# Patient Record
Sex: FEMALE | Race: WHITE | NOT HISPANIC OR LATINO | Employment: FULL TIME | URBAN - METROPOLITAN AREA
[De-identification: names, ages, dates, MRNs, and addresses within clinical notes are randomized per-mention and may not be internally consistent; named-entity substitution may affect disease eponyms.]

---

## 2020-09-17 ENCOUNTER — OFFICE VISIT (OUTPATIENT)
Dept: FAMILY MEDICINE | Facility: CLINIC | Age: 35
End: 2020-09-17
Payer: COMMERCIAL

## 2020-09-17 VITALS
HEART RATE: 74 BPM | DIASTOLIC BLOOD PRESSURE: 73 MMHG | OXYGEN SATURATION: 99 % | BODY MASS INDEX: 31.18 KG/M2 | HEIGHT: 66 IN | SYSTOLIC BLOOD PRESSURE: 107 MMHG | TEMPERATURE: 98 F | WEIGHT: 194 LBS

## 2020-09-17 DIAGNOSIS — N89.8 VAGINAL ODOR: ICD-10-CM

## 2020-09-17 DIAGNOSIS — J45.20 MILD INTERMITTENT ASTHMA WITHOUT COMPLICATION: ICD-10-CM

## 2020-09-17 DIAGNOSIS — R53.83 FATIGUE, UNSPECIFIED TYPE: ICD-10-CM

## 2020-09-17 DIAGNOSIS — G89.29 CHRONIC RIGHT SHOULDER PAIN: ICD-10-CM

## 2020-09-17 DIAGNOSIS — K21.9 GASTROESOPHAGEAL REFLUX DISEASE WITHOUT ESOPHAGITIS: Primary | ICD-10-CM

## 2020-09-17 DIAGNOSIS — M25.511 CHRONIC RIGHT SHOULDER PAIN: ICD-10-CM

## 2020-09-17 PROCEDURE — 99204 OFFICE O/P NEW MOD 45 MIN: CPT | Performed by: NURSE PRACTITIONER

## 2020-09-17 RX ORDER — ALBUTEROL SULFATE 0.83 MG/ML
2.5 SOLUTION RESPIRATORY (INHALATION) EVERY 6 HOURS PRN
COMMUNITY

## 2020-09-17 RX ORDER — OMEPRAZOLE 40 MG/1
40 CAPSULE, DELAYED RELEASE ORAL DAILY
Qty: 90 CAPSULE | Refills: 1 | Status: SHIPPED | OUTPATIENT
Start: 2020-09-17 | End: 2021-03-10

## 2020-09-17 RX ORDER — ALBUTEROL SULFATE 90 UG/1
AEROSOL, METERED RESPIRATORY (INHALATION)
COMMUNITY
Start: 2020-03-14

## 2020-09-17 RX ORDER — LANOLIN ALCOHOL/MO/W.PET/CERES
CREAM (GRAM) TOPICAL DAILY
COMMUNITY
End: 2022-03-21

## 2020-09-17 RX ORDER — FLUTICASONE PROPIONATE 50 MCG
1 SPRAY, SUSPENSION (ML) NASAL 2 TIMES DAILY
Qty: 47.4 ML | Refills: 1 | Status: SHIPPED | OUTPATIENT
Start: 2020-09-17 | End: 2021-03-10

## 2020-09-17 RX ORDER — L. ACIDOPHILUS/PECTIN, CITRUS 25MM-100MG
1 TABLET ORAL
COMMUNITY

## 2020-09-17 RX ORDER — FLUTICASONE PROPIONATE 50 MCG
1 SPRAY, SUSPENSION (ML) NASAL 2 TIMES DAILY
COMMUNITY
Start: 2020-06-15 | End: 2020-09-17

## 2020-09-17 RX ORDER — LORATADINE 10 MG/1
10 TABLET ORAL DAILY
COMMUNITY

## 2020-09-17 RX ORDER — OMEPRAZOLE 40 MG/1
40 CAPSULE, DELAYED RELEASE ORAL DAILY
COMMUNITY
Start: 2020-06-15 | End: 2020-09-17

## 2020-09-17 RX ORDER — LANOLIN ALCOHOL/MO/W.PET/CERES
400 CREAM (GRAM) TOPICAL DAILY
COMMUNITY

## 2020-09-17 RX ORDER — ETONOGESTREL AND ETHINYL ESTRADIOL VAGINAL RING .015; .12 MG/D; MG/D
RING VAGINAL
COMMUNITY
Start: 2020-09-05 | End: 2021-07-07

## 2020-09-17 RX ORDER — DIPHENHYDRAMINE HCL 25 MG
25 TABLET ORAL DAILY
COMMUNITY

## 2020-09-17 SDOH — HEALTH STABILITY: MENTAL HEALTH: HOW OFTEN DO YOU HAVE A DRINK CONTAINING ALCOHOL?: MONTHLY OR LESS

## 2020-09-17 ASSESSMENT — MIFFLIN-ST. JEOR: SCORE: 1601.73

## 2020-09-17 NOTE — PROGRESS NOTES
"Subjective     Stephen Nye is a 35 year old female who presents to clinic today for the following health issues:    HPI       New Patient/Transfer of Care    Patient moved to [place] from [place] in December 2019.  Needs to establish with provider.     She needs refills on a few medications and has a few concerns today.         Hx of GERD- well controlled on Prilosec.  Tried to come off and try alternative, but GERD was not controlled.  Had EGD in the past and was negative for any concerns.      Hx of asthma- reports this has been generally well controlled.   On Breo Ellipta for maintenance.   Has albuterol neb and inhaler for rescue.     Reports chronic pain in right shoulder.  Has been present for 10 years, got hurt playing soccer.  Denies any previous imaging or work-up.  Difficulty reaching arm behind back to fasten bra.  Feels weak when trying to lift weights.  Often click with movement.     Reports unexplained daytime fatigue.  Denies concern for sleep apnea. Generally sleeping okay.  Denies depression.       Reports vaginal odor. Denies other symptoms. Hx of BV in the past. Wears spandex shorts daily under clothes to avoid chaffing.  Has been told this might be contributing but she doesn't want to quit wearing them.  At night, she doesn't wear underwear.       Review of Systems   Constitutional, HEENT, cardiovascular, pulmonary, GI, , musculoskeletal, neuro, skin, endocrine and psych systems are negative, except as otherwise noted.      Objective    /73   Pulse 74   Temp 98  F (36.7  C)   Ht 1.676 m (5' 6\")   Wt 88 kg (194 lb)   SpO2 99%   BMI 31.31 kg/m    Body mass index is 31.31 kg/m .  Physical Exam   GENERAL: healthy, alert and no distress  EYES: Eyes grossly normal to inspection, PERRL and conjunctivae and sclerae normal  HENT: ear canals and TM's normal, nose and mouth without ulcers or lesions  NECK: no adenopathy, no asymmetry, masses, or scars and thyroid normal to palpation  RESP: lungs clear " "to auscultation - no rales, rhonchi or wheezes  CV: regular rate and rhythm, normal S1 S2, no S3 or S4, no murmur, click or rub, no peripheral edema and peripheral pulses strong  ABDOMEN: soft, nontender, no hepatosplenomegaly, no masses and bowel sounds normal  MS: no gross musculoskeletal defects noted, no edema  SKIN: no suspicious lesions or rashes  NEURO: Normal strength and tone, mentation intact and speech normal  PSYCH: mentation appears normal, affect normal/bright    Patient will return for labs and xray.         Assessment & Plan     Gastroesophageal reflux disease without esophagitis  - omeprazole (PRILOSEC) 40 MG DR capsule; Take 1 capsule (40 mg) by mouth daily    Mild intermittent asthma without complication  - BREO ELLIPTA 100-25 MCG/INH inhaler; Inhale 1 puff into the lungs daily  - fluticasone (FLONASE) 50 MCG/ACT nasal spray; Spray 1 spray in nostril 2 times daily    Chronic right shoulder pain  - Orthopedic & Spine  Referral; Future  - XR Shoulder Right 2 Views; Future    Fatigue, unspecified type  - CBC with platelets and differential; Future  - Comprehensive metabolic panel; Future  - TSH with free T4 reflex; Future    Vaginal odor  - Wet prep; Future      Chronic conditions well controlled.  Refills sent as requested.    She reports she is due for pap soon, will make appt for physical. We will try to get records from clinic in [place].   She had to leave for work, will return for labs and shoulder xray as ordered above.  Referral placed to ortho for chronic right shoulder pain.      BMI:   Estimated body mass index is 31.31 kg/m  as calculated from the following:    Height as of this encounter: 1.676 m (5' 6\").    Weight as of this encounter: 88 kg (194 lb).   Weight management plan: Discussed healthy diet and exercise guidelines        See Patient Instructions  Patient Instructions   Refills sent on medications.     Please return for labs (blood and vaginal swab)- do not need to be " fasting.  Will also check xray of your right shoulder.      Referral placed to orthopedics- you will be called to set up appointment.             Return in about 4 weeks (around 10/15/2020) for Annual physical exam with pap.    Magdalena Gongora, [facility]

## 2020-09-17 NOTE — LETTER
My Asthma Action Plan    Name: Stephen Nye   YOB: 1985  Date: 9/18/2020   My doctor: Magdalena Gongora CNP   My clinic: [facility]        My Rescue Medicine:   Albuterol inhaler (Proair/Ventolin/Proventil HFA)  2-4 puffs EVERY 4 HOURS as needed. Use a spacer if recommended by your provider.   My Asthma Severity:   Intermittent / Exercise Induced  Know your asthma triggers             GREEN ZONE   Good Control    I feel good    No cough or wheeze    Can work, sleep and play without asthma symptoms       Take your asthma control medicine every day.     1. If exercise triggers your asthma, take your rescue medication    15 minutes before exercise or sports, and    During exercise if you have asthma symptoms  2. Spacer to use with inhaler: If you have a spacer, make sure to use it with your inhaler             YELLOW ZONE Getting Worse  I have ANY of these:    I do not feel good    Cough or wheeze    Chest feels tight    Wake up at night   1. Keep taking your Green Zone medications  2. Start taking your rescue medicine:    every 20 minutes for up to 1 hour. Then every 4 hours for 24-48 hours.  3. If you stay in the Yellow Zone for more than 12-24 hours, contact your doctor.  4. If you do not return to the Green Zone in 12-24 hours or you get worse, start taking your oral steroid medicine if prescribed by your provider.           RED ZONE Medical Alert - Get Help  I have ANY of these:    I feel awful    Medicine is not helping    Breathing getting harder    Trouble walking or talking    Nose opens wide to breathe       1. Take your rescue medicine NOW  2. If your provider has prescribed an oral steroid medicine, start taking it NOW  3. Call your doctor NOW  4. If you are still in the Red Zone after 20 minutes and you have not reached your doctor:    Take your rescue medicine again and    Call 911 or go to the emergency room right away    See your regular doctor within 2 weeks of an  Emergency Room or Urgent Care visit for follow-up treatment.          Annual Reminders:  Meet with Asthma Educator,  Flu Shot in the Fall, consider Pneumonia Vaccination for patients with asthma (aged 19 and older).    Pharmacy: [address]    Electronically signed by Magdalena Gongora CNP   Date: 09/18/20                    Asthma Triggers  How To Control Things That Make Your Asthma Worse    Triggers are things that make your asthma worse.  Look at the list below to help you find your triggers and   what you can do about them. You can help prevent asthma flare-ups by staying away from your triggers.      Trigger                                                          What you can do   Cigarette Smoke  Tobacco smoke can make asthma worse. Do not allow smoking in your home, car or around you.  Be sure no one smokes at a child s day care or school.  If you smoke, ask your health care provider for ways to help you quit.  Ask family members to quit too.  Ask your health care provider for a referral to Quit Plan to help you quit smoking, or call 8-277-974-PLAN.     Colds, Flu, Bronchitis  These are common triggers of asthma. Wash your hands often.  Don t touch your eyes, nose or mouth.  Get a flu shot every year.     Dust Mites  These are tiny bugs that live in cloth or carpet. They are too small to see. Wash sheets and blankets in hot water every week.   Encase pillows and mattress in dust mite proof covers.  Avoid having carpet if you can. If you have carpet, vacuum weekly.   Use a dust mask and HEPA vacuum.   Pollen and Outdoor Mold  Some people are allergic to trees, grass, or weed pollen, or molds. Try to keep your windows closed.  Limit time out doors when pollen count is high.   Ask you health care provider about taking medicine during allergy season.     Animal Dander  Some people are allergic to skin flakes, urine or saliva from pets with fur or  feathers. Keep pets with fur or feathers out of your home.    If you can t keep the pet outdoors, then keep the pet out of your bedroom.  Keep the bedroom door closed.  Keep pets off cloth furniture and away from stuffed toys.     Mice, Rats, and Cockroaches  Some people are allergic to the waste from these pests.   Cover food and garbage.  Clean up spills and food crumbs.  Store grease in the refrigerator.   Keep food out of the bedroom.   Indoor Mold  This can be a trigger if your home has high moisture. Fix leaking faucets, pipes, or other sources of water.   Clean moldy surfaces.  Dehumidify basement if it is damp and smelly.   Smoke, Strong Odors, and Sprays  These can reduce air quality. Stay away from strong odors and sprays, such as perfume, powder, hair spray, paints, smoke incense, paint, cleaning products, candles and new carpet.   Exercise or Sports  Some people with asthma have this trigger. Be active!  Ask your doctor about taking medicine before sports or exercise to prevent symptoms.    Warm up for 5-10 minutes before and after sports or exercise.     Other Triggers of Asthma  Cold air:  Cover your nose and mouth with a scarf.  Sometimes laughing or crying can be a trigger.  Some medicines and food can trigger asthma.

## 2020-09-17 NOTE — PATIENT INSTRUCTIONS
Refills sent on medications.     Please return for labs (blood and vaginal swab)- do not need to be fasting.  Will also check xray of your right shoulder.      Referral placed to orthopedics- you will be called to set up appointment.

## 2020-09-18 ASSESSMENT — ASTHMA QUESTIONNAIRES: ACT_TOTALSCORE: 23

## 2020-10-02 ENCOUNTER — ANCILLARY PROCEDURE (OUTPATIENT)
Dept: GENERAL RADIOLOGY | Facility: CLINIC | Age: 35
End: 2020-10-02
Attending: NURSE PRACTITIONER
Payer: COMMERCIAL

## 2020-10-02 DIAGNOSIS — N89.8 VAGINAL ODOR: ICD-10-CM

## 2020-10-02 DIAGNOSIS — R53.83 FATIGUE, UNSPECIFIED TYPE: ICD-10-CM

## 2020-10-02 LAB
ALBUMIN SERPL-MCNC: 3.4 G/DL (ref 3.4–5)
ALP SERPL-CCNC: 77 U/L (ref 40–150)
ALT SERPL W P-5'-P-CCNC: 43 U/L (ref 0–50)
ANION GAP SERPL CALCULATED.3IONS-SCNC: 7 MMOL/L (ref 3–14)
AST SERPL W P-5'-P-CCNC: 26 U/L (ref 0–45)
BASOPHILS # BLD AUTO: 0 10E9/L (ref 0–0.2)
BASOPHILS NFR BLD AUTO: 0.7 %
BILIRUB SERPL-MCNC: 0.3 MG/DL (ref 0.2–1.3)
BUN SERPL-MCNC: 14 MG/DL (ref 7–30)
CALCIUM SERPL-MCNC: 8.3 MG/DL (ref 8.5–10.1)
CHLORIDE SERPL-SCNC: 105 MMOL/L (ref 94–109)
CO2 SERPL-SCNC: 26 MMOL/L (ref 20–32)
CREAT SERPL-MCNC: 1 MG/DL (ref 0.52–1.04)
DIFFERENTIAL METHOD BLD: NORMAL
EOSINOPHIL # BLD AUTO: 0.1 10E9/L (ref 0–0.7)
EOSINOPHIL NFR BLD AUTO: 2.2 %
ERYTHROCYTE [DISTWIDTH] IN BLOOD BY AUTOMATED COUNT: 11.9 % (ref 10–15)
GFR SERPL CREATININE-BSD FRML MDRD: 74 ML/MIN/{1.73_M2}
GLUCOSE SERPL-MCNC: 91 MG/DL (ref 70–99)
HCT VFR BLD AUTO: 39.3 % (ref 35–47)
HGB BLD-MCNC: 13.7 G/DL (ref 11.7–15.7)
LYMPHOCYTES # BLD AUTO: 1.4 10E9/L (ref 0.8–5.3)
LYMPHOCYTES NFR BLD AUTO: 34.1 %
MCH RBC QN AUTO: 31.1 PG (ref 26.5–33)
MCHC RBC AUTO-ENTMCNC: 34.9 G/DL (ref 31.5–36.5)
MCV RBC AUTO: 89 FL (ref 78–100)
MONOCYTES # BLD AUTO: 0.4 10E9/L (ref 0–1.3)
MONOCYTES NFR BLD AUTO: 9.2 %
NEUTROPHILS # BLD AUTO: 2.2 10E9/L (ref 1.6–8.3)
NEUTROPHILS NFR BLD AUTO: 53.8 %
PLATELET # BLD AUTO: 237 10E9/L (ref 150–450)
POTASSIUM SERPL-SCNC: 3.7 MMOL/L (ref 3.4–5.3)
PROT SERPL-MCNC: 7.8 G/DL (ref 6.8–8.8)
RBC # BLD AUTO: 4.4 10E12/L (ref 3.8–5.2)
SODIUM SERPL-SCNC: 138 MMOL/L (ref 133–144)
SPECIMEN SOURCE: NORMAL
TSH SERPL DL<=0.005 MIU/L-ACNC: 3.78 MU/L (ref 0.4–4)
WBC # BLD AUTO: 4.1 10E9/L (ref 4–11)
WET PREP SPEC: NORMAL

## 2020-10-02 PROCEDURE — 87210 SMEAR WET MOUNT SALINE/INK: CPT | Performed by: NURSE PRACTITIONER

## 2020-10-02 PROCEDURE — 36415 COLL VENOUS BLD VENIPUNCTURE: CPT | Performed by: NURSE PRACTITIONER

## 2020-10-02 PROCEDURE — 73030 X-RAY EXAM OF SHOULDER: CPT | Mod: RT | Performed by: RADIOLOGY

## 2020-10-02 PROCEDURE — 80050 GENERAL HEALTH PANEL: CPT | Performed by: NURSE PRACTITIONER
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
## 2020-12-10 RX ORDER — FLUCONAZOLE 150 MG/1
150 TABLET ORAL DAILY
Qty: 3 TABLET | Refills: 0 | Status: SHIPPED | OUTPATIENT
Start: 2020-12-10 | End: 2020-12-13

## 2020-12-10 RX ORDER — OXYCODONE HYDROCHLORIDE 5 MG/1
5 TABLET ORAL EVERY 6 HOURS PRN
Qty: 12 TABLET | Refills: 0 | Status: SHIPPED | OUTPATIENT
Start: 2020-12-10 | End: 2020-12-13
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
## 2021-01-04 ENCOUNTER — HEALTH MAINTENANCE LETTER (OUTPATIENT)
Age: 36
End: 2021-01-04

## 2021-03-10 ENCOUNTER — OFFICE VISIT (OUTPATIENT)
Dept: FAMILY MEDICINE | Facility: CLINIC | Age: 36
End: 2021-03-10
Payer: COMMERCIAL

## 2021-03-10 ENCOUNTER — TELEPHONE (OUTPATIENT)
Dept: INTERNAL MEDICINE | Facility: CLINIC | Age: 36
End: 2021-03-10

## 2021-03-10 VITALS
HEIGHT: 66 IN | DIASTOLIC BLOOD PRESSURE: 71 MMHG | WEIGHT: 194 LBS | OXYGEN SATURATION: 99 % | HEART RATE: 66 BPM | SYSTOLIC BLOOD PRESSURE: 105 MMHG | TEMPERATURE: 97.6 F | BODY MASS INDEX: 31.18 KG/M2

## 2021-03-10 DIAGNOSIS — Z12.4 SCREENING FOR MALIGNANT NEOPLASM OF CERVIX: ICD-10-CM

## 2021-03-10 DIAGNOSIS — Z80.3 FAMILY HISTORY OF MALIGNANT NEOPLASM OF BREAST: ICD-10-CM

## 2021-03-10 DIAGNOSIS — N89.8 VAGINAL DISCHARGE: ICD-10-CM

## 2021-03-10 DIAGNOSIS — J45.20 MILD INTERMITTENT ASTHMA WITHOUT COMPLICATION: ICD-10-CM

## 2021-03-10 DIAGNOSIS — Z00.00 ROUTINE GENERAL MEDICAL EXAMINATION AT A HEALTH CARE FACILITY: Primary | ICD-10-CM

## 2021-03-10 DIAGNOSIS — Z13.220 LIPID SCREENING: ICD-10-CM

## 2021-03-10 DIAGNOSIS — K21.9 GASTROESOPHAGEAL REFLUX DISEASE WITHOUT ESOPHAGITIS: ICD-10-CM

## 2021-03-10 LAB
CHOLEST SERPL-MCNC: 243 MG/DL
HDLC SERPL-MCNC: 54 MG/DL
LDLC SERPL CALC-MCNC: 158 MG/DL
NONHDLC SERPL-MCNC: 189 MG/DL
SPECIMEN SOURCE: NORMAL
TRIGL SERPL-MCNC: 156 MG/DL
WET PREP SPEC: NORMAL

## 2021-03-10 PROCEDURE — 36415 COLL VENOUS BLD VENIPUNCTURE: CPT | Performed by: NURSE PRACTITIONER

## 2021-03-10 PROCEDURE — 80061 LIPID PANEL: CPT | Performed by: NURSE PRACTITIONER

## 2021-03-10 PROCEDURE — 87624 HPV HI-RISK TYP POOLED RSLT: CPT | Performed by: NURSE PRACTITIONER

## 2021-03-10 PROCEDURE — 99395 PREV VISIT EST AGE 18-39: CPT | Performed by: NURSE PRACTITIONER

## 2021-03-10 PROCEDURE — G0145 SCR C/V CYTO,THINLAYER,RESCR: HCPCS | Performed by: NURSE PRACTITIONER

## 2021-03-10 PROCEDURE — 87210 SMEAR WET MOUNT SALINE/INK: CPT | Performed by: NURSE PRACTITIONER

## 2021-03-10 RX ORDER — FLUTICASONE PROPIONATE 50 MCG
1 SPRAY, SUSPENSION (ML) NASAL 2 TIMES DAILY
Qty: 47.7 ML | Refills: 3 | Status: SHIPPED | OUTPATIENT
Start: 2021-03-10 | End: 2022-08-26

## 2021-03-10 RX ORDER — OMEPRAZOLE 40 MG/1
40 CAPSULE, DELAYED RELEASE ORAL DAILY
Qty: 90 CAPSULE | Refills: 3 | Status: SHIPPED | OUTPATIENT
Start: 2021-03-10 | End: 2022-03-21

## 2021-03-10 ASSESSMENT — ENCOUNTER SYMPTOMS
ABDOMINAL PAIN: 0
NERVOUS/ANXIOUS: 0
MYALGIAS: 0
CHILLS: 0
PALPITATIONS: 0
CONSTIPATION: 0
HEARTBURN: 0
DYSURIA: 0
PARESTHESIAS: 0
HEMATOCHEZIA: 0
FEVER: 0
EYE PAIN: 0
ARTHRALGIAS: 1
FREQUENCY: 0
DIZZINESS: 0
HEADACHES: 0
HEMATURIA: 0
JOINT SWELLING: 0
BREAST MASS: 0
SORE THROAT: 0
SHORTNESS OF BREATH: 0
COUGH: 0
DIARRHEA: 0
WEAKNESS: 0
NAUSEA: 0

## 2021-03-10 ASSESSMENT — MIFFLIN-ST. JEOR: SCORE: 1601.73

## 2021-03-10 NOTE — PROGRESS NOTES
SUBJECTIVE:   CC: Stephen Nye is an 35 year old woman who presents for preventive health visit.       Due for pap today- records not available.  Patient states history of abnormal pap and HPV positive in 2006.  Had cryo procedure following that and has had normal pap/hpv since.  She believes last pap was 2019.  She has been having more vaginal discharge since using Nuvaring.  Denies any fever, chills, abdominal pain or odors.     History of breast cancer in maternal grandmother when she was in late 20s or early 30s.   Denies family history in first degree relative.     Mother  from COVID-19 complications in December.  Feels like she is coping okay. Started exercising again, this is helping. Declines therapy.        Hx of GERD- well controlled on Prilosec.  Tried to come off and try alternative, but GERD was not controlled.  Had EGD in the past and was negative for any concerns.       Hx of asthma- reports this has been generally well controlled.   On Breo Ellipta for maintenance.   Has albuterol neb and inhaler for rescue.  On Flonase for allergies.       Patient has been advised of split billing requirements and indicates understanding: Yes  Healthy Habits:     Getting at least 3 servings of Calcium per day:  NO    Bi-annual eye exam:  Yes    Dental care twice a year:  NO    Sleep apnea or symptoms of sleep apnea:  Daytime drowsiness    Diet:  Regular (no restrictions)    Frequency of exercise:  None    Taking medications regularly:  Yes    Medication side effects:  None    PHQ-2 Total Score: 2    Additional concerns today:  No        Today's PHQ-2 Score:   PHQ-2 (  [employer]) 3/10/2021   Q1: Little interest or pleasure in doing things 1   Q2: Feeling down, depressed or hopeless 1   PHQ-2 Score 2   Q1: Little interest or pleasure in doing things Several days   Q2: Feeling down, depressed or hopeless Several days   PHQ-2 Score 2       Abuse: Current or Past (Physical, Sexual or Emotional) - No  Do you feel  safe in your environment? Yes    Have you ever done Advance Care Planning? (For example, a Health Directive, POLST, or a discussion with a medical provider or your loved ones about your wishes): No, advance care planning information given to patient to review.  Patient declined advance care planning discussion at this time.    Social History     Tobacco Use     Smoking status: Never Smoker     Smokeless tobacco: Never Used   Substance Use Topics     Alcohol use: Yes     Frequency: Monthly or less     If you drink alcohol do you typically have >3 drinks per day or >7 drinks per week? No    Alcohol Use 3/10/2021   Prescreen: >3 drinks/day or >7 drinks/week? Not Applicable   Prescreen: >3 drinks/day or >7 drinks/week? -         Reviewed orders with patient.  Reviewed health maintenance and updated orders accordingly - Yes      Breast CA Risk Screening:  Breast CA Risk Assessment (FHS-7) 3/10/2021   Do you have a family history of breast, colon, or ovarian cancer? Yes- maternal grandmother    Did any of your first-degree relatives have breast or ovarian cancer? No   Did any of your relatives have bilateral breast cancer? Unknown   Did any man in your family have breast cancer? No   Did any woman in your family have breast and ovarian cancer? No   Did any woman in your family have breast cancer before age 50 y? Yes- grandmother age 20s-30   Do you have 2 or more relatives with breast and/or ovarian cancer? No   Do you have 2 or more relatives with breast and/or bowel cancer? No     Patient under 40 years of age: Routine Mammogram Screening not recommended.   Pertinent mammograms are reviewed under the imaging tab.    History of abnormal Pap smear: YES per patient, although records not available.      Reviewed and updated as needed this visit by clinical staff  Tobacco  Allergies  Meds  Problems  Med Hx  Surg Hx  Fam Hx          Reviewed and updated as needed this visit by Provider  Tobacco  Allergies  Meds   "Problems  Med Hx  Surg Hx  Fam Hx             Review of Systems   Constitutional: Negative for chills and fever.   HENT: Negative for congestion, ear pain, hearing loss and sore throat.    Eyes: Negative for pain and visual disturbance.   Respiratory: Negative for cough and shortness of breath.    Cardiovascular: Negative for chest pain, palpitations and peripheral edema.   Gastrointestinal: Negative for abdominal pain, constipation, diarrhea, heartburn, hematochezia and nausea.   Breasts:  Negative for tenderness, breast mass and discharge.   Genitourinary: Positive for vaginal discharge. Negative for dysuria, frequency, genital sores, hematuria, pelvic pain, urgency and vaginal bleeding.   Musculoskeletal: Positive for arthralgias. Negative for joint swelling and myalgias.   Skin: Negative for rash.   Neurological: Negative for dizziness, weakness, headaches and paresthesias.   Psychiatric/Behavioral: Positive for mood changes. The patient is not nervous/anxious.         OBJECTIVE:   /71   Pulse 66   Temp 97.6  F (36.4  C)   Ht 1.676 m (5' 6\")   Wt 88 kg (194 lb)   SpO2 99%   BMI 31.31 kg/m    Physical Exam  GENERAL: healthy, alert and no distress  EYES: Eyes grossly normal to inspection, PERRL and conjunctivae and sclerae normal  HENT: ear canals and TM's normal, nose and mouth without ulcers or lesions  NECK: no adenopathy, no asymmetry, masses, or scars and thyroid normal to palpation  RESP: lungs clear to auscultation - no rales, rhonchi or wheezes  BREAST: normal without masses, tenderness or nipple discharge and no palpable axillary masses or adenopathy  CV: regular rate and rhythm, normal S1 S2, no S3 or S4, no murmur, click or rub, no peripheral edema and peripheral pulses strong  ABDOMEN: soft, nontender, no hepatosplenomegaly, no masses and bowel sounds normal   (female): normal female external genitalia, normal urethral meatus, vaginal mucosa pink, moist, well rugated, and normal " "cervix/adnexa/uterus without masses or discharge  MS: no gross musculoskeletal defects noted, no edema  SKIN: no suspicious lesions or rashes  NEURO: Normal strength and tone, mentation intact and speech normal  PSYCH: mentation appears normal, affect normal/bright    Diagnostic Test Results:  Labs reviewed in Epic    ASSESSMENT/PLAN:   1. Routine general medical examination at a health care facility  Continue annual exams    2. Screening for malignant neoplasm of cervix  - Pap imaged thin layer screen with HPV - recommended age 30 - 65 years (select HPV order below)  - HPV High Risk Types DNA Cervical    3. Family history of malignant neoplasm of breast  Breast cancer in maternal grandmother at young age   - CANCER RISK MGMT/CANCER GENETIC COUNSELING REFERRAL    4. Lipid screening  - Lipid panel reflex to direct LDL Fasting    5. Mild intermittent asthma without complication  Chronic, stable.   - BREO ELLIPTA 100-25 MCG/INH inhaler; Inhale 1 puff into the lungs daily  Dispense: 3 each; Refill: 3  - fluticasone (FLONASE) 50 MCG/ACT nasal spray; Spray 1 spray in nostril 2 times daily  Dispense: 47.7 mL; Refill: 3    6. Gastroesophageal reflux disease without esophagitis  Chronic, stable.   - omeprazole (PRILOSEC) 40 MG DR capsule; Take 1 capsule (40 mg) by mouth daily  Dispense: 90 capsule; Refill: 3    7. Vaginal discharge  Wet prep normal.   - Wet prep    Patient has been advised of split billing requirements and indicates understanding: Yes  COUNSELING:  Reviewed preventive health counseling, as reflected in patient instructions    Estimated body mass index is 31.31 kg/m  as calculated from the following:    Height as of this encounter: 1.676 m (5' 6\").    Weight as of this encounter: 88 kg (194 lb).      She reports that she has never smoked. She has never used smokeless tobacco.      Counseling Resources:  ATP IV Guidelines  Pooled Cohorts Equation Calculator  Breast Cancer Risk Calculator  BRCA-Related Cancer " Risk Assessment: FHS-7 Tool  FRAX Risk Assessment  ICSI Preventive Guidelines  Dietary Guidelines for Americans, 2010  USDA's MyPlate  ASA Prophylaxis  Lung CA Screening    Magdalena Gongora [facility]

## 2021-03-10 NOTE — Clinical Note
Please mail patient ACT to complete and return, was missed today.   Thank you! Magdalena Gongora, CNP

## 2021-03-12 ENCOUNTER — TELEPHONE (OUTPATIENT)
Dept: INTERNAL MEDICINE | Facility: CLINIC | Age: 36
End: 2021-03-12

## 2021-03-12 LAB
COPATH REPORT: NORMAL
PAP: NORMAL

## 2021-03-12 NOTE — LETTER
"    March 12, 2021       TO: Stephen Nye  [id number] [address] [address]         Dear Ms. Nye,    Our records indicate that you have not scheduled an appointment for a consult, as recommended by Magdalena Gongora CNP. If you wish to schedule within [facility], we have several options to help you schedule your appointment:      Call [phone]    Visit our website at [email or web address] and click \"I Want To\" (in upper right) and select Request an Appointment    Request an appointment via [employer] at [email or web address]     If you have chosen to schedule elsewhere or if you have already made an appointment, please disregard this letter.    If you have any questions or concerns regarding the information above, please contact the [facility] at [phone].      Sincerely,      [facility]                  "

## 2021-03-12 NOTE — TELEPHONE ENCOUNTER
Oncology/Surgical Oncology Referral Request:     Specialty Requested: Medical Oncology     Referring Provider: Magdalena Gongora CNP    Referring Clinic/Organization: [facility]    Records location: [facility]     Requested Provider (if specified): Not Specified       Called pt x 2, LVM. Sending letter.

## 2021-03-16 LAB
FINAL DIAGNOSIS: NORMAL
HPV HR 12 DNA CVX QL NAA+PROBE: NEGATIVE
HPV16 DNA SPEC QL NAA+PROBE: NEGATIVE
HPV18 DNA SPEC QL NAA+PROBE: NEGATIVE
SPECIMEN DESCRIPTION: NORMAL
SPECIMEN SOURCE CVX/VAG CYTO: NORMAL

## 2021-03-19 ENCOUNTER — PATIENT OUTREACH (OUTPATIENT)
Dept: INTERNAL MEDICINE | Facility: CLINIC | Age: 36
End: 2021-03-19

## 2021-03-19 NOTE — TELEPHONE ENCOUNTER
"\"Patient states history of abnormal pap and HPV positive in 2006.  Had cryo procedure following that and has had normal pap/hpv since.  She believes last pap was 2019.\" - 3/10/2021 office visit notes    3/10/2021 NIL pap, neg HPV. Plan cotest in 1 year based on +abnormal history per patient report. No previous pap records available to review.  "

## 2021-07-07 DIAGNOSIS — Z30.015 ENCOUNTER FOR INITIAL PRESCRIPTION OF VAGINAL RING HORMONAL CONTRACEPTIVE: Primary | ICD-10-CM

## 2021-07-08 RX ORDER — ETONOGESTREL AND ETHINYL ESTRADIOL VAGINAL RING .015; .12 MG/D; MG/D
1 RING VAGINAL
Qty: 3 EACH | Refills: 2 | Status: SHIPPED | OUTPATIENT
Start: 2021-07-08 | End: 2022-03-02

## 2021-07-08 NOTE — TELEPHONE ENCOUNTER
Routing refill request to provider for review/approval because:  Medication is reported/historical    Darlin Mercado BSN, RN

## 2021-10-11 ENCOUNTER — HEALTH MAINTENANCE LETTER (OUTPATIENT)
Age: 36
End: 2021-10-11

## 2021-12-15 ENCOUNTER — OFFICE VISIT (OUTPATIENT)
Dept: OPTOMETRY | Facility: CLINIC | Age: 36
End: 2021-12-15
Payer: COMMERCIAL

## 2021-12-15 ENCOUNTER — TELEPHONE (OUTPATIENT)
Dept: OPTOMETRY | Facility: CLINIC | Age: 36
End: 2021-12-15

## 2021-12-15 DIAGNOSIS — H04.123 DRY EYES: ICD-10-CM

## 2021-12-15 DIAGNOSIS — H18.831 RECURRENT EROSION OF RIGHT CORNEA: ICD-10-CM

## 2021-12-15 DIAGNOSIS — H52.13 MYOPIA OF BOTH EYES: Primary | ICD-10-CM

## 2021-12-15 PROCEDURE — 92004 COMPRE OPH EXAM NEW PT 1/>: CPT | Performed by: OPTOMETRIST

## 2021-12-15 PROCEDURE — 92015 DETERMINE REFRACTIVE STATE: CPT | Performed by: OPTOMETRIST

## 2021-12-15 RX ORDER — SODIUM CHLORIDE 5 %
OINTMENT (GRAM) OPHTHALMIC (EYE)
Qty: 3.5 G | Refills: 3 | COMMUNITY

## 2021-12-15 RX ORDER — SILICONE ADHESIVE 1.5" X 3"
1 SHEET (EA) TOPICAL
Qty: 15 ML | Refills: 3 | Status: SHIPPED | OUTPATIENT
Start: 2021-12-15 | End: 2021-12-15

## 2021-12-15 RX ORDER — SODIUM CHLORIDE 5 %
OINTMENT (GRAM) OPHTHALMIC (EYE)
Qty: 3.5 G | Refills: 3 | Status: SHIPPED | OUTPATIENT
Start: 2021-12-15 | End: 2021-12-15

## 2021-12-15 RX ORDER — SILICONE ADHESIVE 1.5" X 3"
1 SHEET (EA) TOPICAL 3 TIMES DAILY
Qty: 15 ML | Refills: 3 | Status: SHIPPED | OUTPATIENT
Start: 2021-12-15 | End: 2022-02-13

## 2021-12-15 ASSESSMENT — REFRACTION_MANIFEST
OD_CYLINDER: +0.25
OS_CYLINDER: SPHERE
OD_AXIS: 010
OS_SPHERE: -0.75
OD_AXIS: 025
OD_SPHERE: -0.50
METHOD_AUTOREFRACTION: 1
OD_SPHERE: -1.25
OS_AXIS: 115
OS_CYLINDER: +0.25
OD_CYLINDER: +0.50
OS_SPHERE: -0.50

## 2021-12-15 ASSESSMENT — VISUAL ACUITY
OS_SC: 20/25
OD_SC+: -1
OD_SC: 20/20
METHOD: SNELLEN - LINEAR
OD_SC: 20/25
OS_SC: 20/20
OS_SC+: -1

## 2021-12-15 ASSESSMENT — KERATOMETRY
OS_K1POWER_DIOPTERS: 40.25
OS_AXISANGLE2_DEGREES: 19
OD_AXISANGLE2_DEGREES: 151
OD_K2POWER_DIOPTERS: 41.50
OS_K2POWER_DIOPTERS: 41.25
OD_K1POWER_DIOPTERS: 41.00

## 2021-12-15 ASSESSMENT — TONOMETRY
IOP_METHOD: APPLANATION
OS_IOP_MMHG: 16
OD_IOP_MMHG: 16

## 2021-12-15 ASSESSMENT — CONF VISUAL FIELD
METHOD: COUNTING FINGERS
OS_NORMAL: 1
OD_NORMAL: 1

## 2021-12-15 ASSESSMENT — CUP TO DISC RATIO
OS_RATIO: 0.2
OD_RATIO: 0.2

## 2021-12-15 NOTE — PROGRESS NOTES
Chief Complaint   Patient presents with     COMPREHENSIVE EYE EXAM     New to Eye Dept          Last Eye Exam: 12/2018  Dilated Previously: Yes, does not want to be dilated today. She has to work in the OR after this appointment     What are you currently using to see?  does not use glasses or contacts       Distance Vision Acuity: Satisfied with vision, sometimes right eye gets blurry when she's tired     Near Vision Acuity: Satisfied with vision while reading and using computer unaided, Does have trouble at times transitioning from near to far - after using  LASIK Nov 2011    Eye Comfort: good, dry and also has been experiencing some sensitivity or FB feelings in her right eye. That eye also gets tired faster and red quicker.  Worse since wore colored contact lenses for Halloween    Do you use eye drops? : Yes: As needed, Refresh Optive NPT   Occupation or Hobbies: RN, [employer]       Dry in morning ,     Medical, surgical and family histories reviewed and updated 12/15/2021.       OBJECTIVE: See Ophthalmology exam    ASSESSMENT:    ICD-10-CM    1. Myopia of both eyes  H52.13 EYE EXAM (SIMPLE-NONBILLABLE)     REFRACTION   2. Recurrent erosion of right cornea  H18.831 EYE EXAM (SIMPLE-NONBILLABLE)     REFRACTION     sodium chloride (FLACO 128) 5 % ophthalmic solution     DISCONTINUED: sodium chloride (FLACO 128) 5 % ophthalmic ointment     DISCONTINUED: sodium chloride (FLACO 128) 5 % ophthalmic solution   3. Dry eyes  H04.123 EYE EXAM (SIMPLE-NONBILLABLE)     REFRACTION      PLAN:     Patient Instructions   Optional to fill new glasses prescription, minimal  glasses prescription   Use Thera Tears Nighttime Dry eye Therapy Lubricant Eye Gel- Preservative free,   3-4  times a day as needed   Use Flaco ointment at bed time, Flaco solution 3 times a day   Return in 1 year for eye exam    Marni Ayala, OD  [phone]

## 2021-12-15 NOTE — PATIENT INSTRUCTIONS
Optional to fill new glasses prescription, minimal  glasses prescription   Use Thera Tears Nighttime Dry eye Therapy Lubricant Eye Gel- Preservative free,   3-4  times a day as needed   Use Flaco ointment at bed time, Flaco solution 3 times a day   Return in 1 year for eye exam    Marni Ayala, OD  [phone]

## 2021-12-15 NOTE — LETTER
12/15/2021         RE: Stephen Nye  [address] [facility] [address]        Dear Colleague,    Thank you for referring your patient, Stephen Nye, to the [facility]. Please see a copy of my visit note below.    Chief Complaint   Patient presents with     COMPREHENSIVE EYE EXAM     New to Eye Dept          Last Eye Exam: 12/2018  Dilated Previously: Yes, does not want to be dilated today. She has to work in the OR after this appointment     What are you currently using to see?  does not use glasses or contacts       Distance Vision Acuity: Satisfied with vision, sometimes right eye gets blurry when she's tired     Near Vision Acuity: Satisfied with vision while reading and using computer unaided, Does have trouble at times transitioning from near to far - after using  LASIK Nov 2011    Eye Comfort: good, dry and also has been experiencing some sensitivity or FB feelings in her right eye. That eye also gets tired faster and red quicker.  Worse since wore colored contact lenses for Halloween    Do you use eye drops? : Yes: As needed, Refresh Optive NPT   Occupation or Hobbies: RN, For [employer]       Dry in morning ,     Medical, surgical and family histories reviewed and updated 12/15/2021.       OBJECTIVE: See Ophthalmology exam    ASSESSMENT:    ICD-10-CM    1. Myopia of both eyes  H52.13 EYE EXAM (SIMPLE-NONBILLABLE)     REFRACTION   2. Recurrent erosion of right cornea  H18.831 EYE EXAM (SIMPLE-NONBILLABLE)     REFRACTION     sodium chloride (PEREZ 128) 5 % ophthalmic solution     DISCONTINUED: sodium chloride (PEREZ 128) 5 % ophthalmic ointment     DISCONTINUED: sodium chloride (PEREZ 128) 5 % ophthalmic solution   3. Dry eyes  H04.123 EYE EXAM (SIMPLE-NONBILLABLE)     REFRACTION      PLAN:     Patient Instructions   Optional to fill new glasses prescription, minimal  glasses prescription   Use Thera Tears Nighttime Dry eye  Therapy Lubricant Eye Gel- Preservative free,   3-4  times a day as needed   Use Flaco ointment at bed time, Flaco solution 3 times a day   Return in 1 year for eye exam    Marni Ayala, OD  [phone]                       Again, thank you for allowing me to participate in the care of your patient.        Sincerely,        Marni Ayala, OD

## 2021-12-15 NOTE — TELEPHONE ENCOUNTER
The pharmacy is wondering about the instructions with the prescription. It says on the prescription to use this every three hours for dry eye and this eye drop isn't normally used for this type of medication so they wanted to get clarification on this. They would like a call back to discuss at: [phone] when able.    Thank You,    Bill Casey  Patient Rep

## 2022-01-19 ENCOUNTER — MYC MEDICAL ADVICE (OUTPATIENT)
Dept: FAMILY MEDICINE | Facility: CLINIC | Age: 37
End: 2022-01-19
Payer: COMMERCIAL

## 2022-01-24 ASSESSMENT — ASTHMA QUESTIONNAIRES: ACT_TOTALSCORE: 24

## 2022-02-18 ENCOUNTER — PATIENT OUTREACH (OUTPATIENT)
Dept: FAMILY MEDICINE | Facility: CLINIC | Age: 37
End: 2022-02-18
Payer: COMMERCIAL

## 2022-02-18 PROBLEM — Z87.42 HX OF ABNORMAL CERVICAL PAP SMEAR: Status: ACTIVE | Noted: 2021-03-19

## 2022-03-01 DIAGNOSIS — Z30.015 ENCOUNTER FOR INITIAL PRESCRIPTION OF VAGINAL RING HORMONAL CONTRACEPTIVE: ICD-10-CM

## 2022-03-02 ENCOUNTER — TELEPHONE (OUTPATIENT)
Dept: INTERNAL MEDICINE | Facility: CLINIC | Age: 37
End: 2022-03-02
Payer: COMMERCIAL

## 2022-03-02 DIAGNOSIS — Z30.015 ENCOUNTER FOR INITIAL PRESCRIPTION OF VAGINAL RING HORMONAL CONTRACEPTIVE: ICD-10-CM

## 2022-03-02 RX ORDER — ETONOGESTREL AND ETHINYL ESTRADIOL VAGINAL RING .015; .12 MG/D; MG/D
RING VAGINAL
Qty: 3 EACH | Refills: 2 | Status: SHIPPED | OUTPATIENT
Start: 2022-03-02 | End: 2022-03-21

## 2022-03-02 RX ORDER — ETONOGESTREL AND ETHINYL ESTRADIOL VAGINAL RING .015; .12 MG/D; MG/D
1 RING VAGINAL
Qty: 3 EACH | Refills: 0 | Status: SHIPPED | OUTPATIENT
Start: 2022-03-02 | End: 2022-03-02

## 2022-03-02 NOTE — TELEPHONE ENCOUNTER
Leave in for 3 weeks, remove for 1 week, then place new ring.   I added additional instructions. Magdalena Gongora, CNP

## 2022-03-02 NOTE — TELEPHONE ENCOUNTER
Pharmacy called because they received prescription for Nuvaring.    They need clarification on the sig.    Sig - Route: Place 1 each vaginally every 30 days - Vaginal    Last time the medication was refilled they had called the pt to get clarification and pt told them that she wears the ring for 3 weeks and then removes it for 1 week.    Pharmacy is wanting to know if instructions changed.      Routing to provider.    Danna Mcgowan RN  [facility]

## 2022-03-03 NOTE — TELEPHONE ENCOUNTER
Called above pharmacy with new order and they said they already received it.    Darlin Mercado BSN, RN

## 2022-03-14 ASSESSMENT — ENCOUNTER SYMPTOMS
HEMATOCHEZIA: 0
FEVER: 0
CHILLS: 0
ARTHRALGIAS: 0
HEADACHES: 0
NAUSEA: 0
FREQUENCY: 0
NERVOUS/ANXIOUS: 0
JOINT SWELLING: 0
HEARTBURN: 0
BREAST MASS: 0
DIZZINESS: 0
HEMATURIA: 0
ABDOMINAL PAIN: 0
MYALGIAS: 0
PALPITATIONS: 0
WEAKNESS: 0
SHORTNESS OF BREATH: 0
SORE THROAT: 0
COUGH: 0
DYSURIA: 0
DIARRHEA: 0
PARESTHESIAS: 0
CONSTIPATION: 0
EYE PAIN: 0

## 2022-03-21 ENCOUNTER — OFFICE VISIT (OUTPATIENT)
Dept: FAMILY MEDICINE | Facility: CLINIC | Age: 37
End: 2022-03-21
Payer: COMMERCIAL

## 2022-03-21 ENCOUNTER — PATIENT OUTREACH (OUTPATIENT)
Dept: ONCOLOGY | Facility: CLINIC | Age: 37
End: 2022-03-21

## 2022-03-21 VITALS
DIASTOLIC BLOOD PRESSURE: 80 MMHG | TEMPERATURE: 97.9 F | HEART RATE: 92 BPM | HEIGHT: 66 IN | SYSTOLIC BLOOD PRESSURE: 117 MMHG | OXYGEN SATURATION: 98 % | BODY MASS INDEX: 27.97 KG/M2 | WEIGHT: 174 LBS

## 2022-03-21 DIAGNOSIS — K21.9 GASTROESOPHAGEAL REFLUX DISEASE WITHOUT ESOPHAGITIS: ICD-10-CM

## 2022-03-21 DIAGNOSIS — J45.20 MILD INTERMITTENT ASTHMA WITHOUT COMPLICATION: ICD-10-CM

## 2022-03-21 DIAGNOSIS — Z30.015 ENCOUNTER FOR INITIAL PRESCRIPTION OF VAGINAL RING HORMONAL CONTRACEPTIVE: ICD-10-CM

## 2022-03-21 DIAGNOSIS — R68.84 JAW PAIN: ICD-10-CM

## 2022-03-21 DIAGNOSIS — Z13.1 SCREENING FOR DIABETES MELLITUS: ICD-10-CM

## 2022-03-21 DIAGNOSIS — Z80.3 FAMILY HISTORY OF MALIGNANT NEOPLASM OF BREAST: ICD-10-CM

## 2022-03-21 DIAGNOSIS — K59.01 SLOW TRANSIT CONSTIPATION: ICD-10-CM

## 2022-03-21 DIAGNOSIS — Z13.220 LIPID SCREENING: ICD-10-CM

## 2022-03-21 DIAGNOSIS — Z00.00 ROUTINE GENERAL MEDICAL EXAMINATION AT A HEALTH CARE FACILITY: Primary | ICD-10-CM

## 2022-03-21 DIAGNOSIS — Z12.4 CERVICAL CANCER SCREENING: ICD-10-CM

## 2022-03-21 LAB
CHOLEST SERPL-MCNC: 203 MG/DL
FASTING STATUS PATIENT QL REPORTED: YES
FASTING STATUS PATIENT QL REPORTED: YES
GLUCOSE BLD-MCNC: 92 MG/DL (ref 70–99)
HDLC SERPL-MCNC: 54 MG/DL
LDLC SERPL CALC-MCNC: 129 MG/DL
NONHDLC SERPL-MCNC: 149 MG/DL
TRIGL SERPL-MCNC: 102 MG/DL

## 2022-03-21 PROCEDURE — 82947 ASSAY GLUCOSE BLOOD QUANT: CPT | Performed by: NURSE PRACTITIONER

## 2022-03-21 PROCEDURE — 87624 HPV HI-RISK TYP POOLED RSLT: CPT | Performed by: NURSE PRACTITIONER

## 2022-03-21 PROCEDURE — G0145 SCR C/V CYTO,THINLAYER,RESCR: HCPCS | Performed by: NURSE PRACTITIONER

## 2022-03-21 PROCEDURE — 36415 COLL VENOUS BLD VENIPUNCTURE: CPT | Performed by: NURSE PRACTITIONER

## 2022-03-21 PROCEDURE — 99395 PREV VISIT EST AGE 18-39: CPT | Performed by: NURSE PRACTITIONER

## 2022-03-21 PROCEDURE — 80061 LIPID PANEL: CPT | Performed by: NURSE PRACTITIONER

## 2022-03-21 RX ORDER — ETONOGESTREL AND ETHINYL ESTRADIOL VAGINAL RING .015; .12 MG/D; MG/D
RING VAGINAL
Qty: 3 EACH | Refills: 3 | OUTPATIENT
Start: 2022-03-21 | End: 2022-05-19

## 2022-03-21 RX ORDER — OMEPRAZOLE 40 MG/1
40 CAPSULE, DELAYED RELEASE ORAL DAILY
Qty: 90 CAPSULE | Refills: 3 | Status: SHIPPED | OUTPATIENT
Start: 2022-03-21

## 2022-03-21 ASSESSMENT — ENCOUNTER SYMPTOMS
HEMATOCHEZIA: 0
ABDOMINAL PAIN: 0
WEAKNESS: 0
PARESTHESIAS: 0
PALPITATIONS: 0
EYE PAIN: 0
MYALGIAS: 0
JOINT SWELLING: 0
DIZZINESS: 0
SORE THROAT: 0
CHILLS: 0
HEMATURIA: 0
CONSTIPATION: 0
NAUSEA: 0
NERVOUS/ANXIOUS: 0
DIARRHEA: 0
BREAST MASS: 0
COUGH: 0
FEVER: 0
ARTHRALGIAS: 0
HEADACHES: 0
HEARTBURN: 0
DYSURIA: 0
SHORTNESS OF BREATH: 0
FREQUENCY: 0

## 2022-03-21 ASSESSMENT — ASTHMA QUESTIONNAIRES: ACT_TOTALSCORE: 25

## 2022-03-21 NOTE — PROGRESS NOTES
SUBJECTIVE:   CC: Stephen Nye is an 36 year old woman who presents for preventive health visit.       Mood has been worse, called off her wedding due to her SO not being sober.  Denies any SI or HI, declines assistance with this.   Asthma has been under control.    She would like a tubal ligation.  States she never wants to have kids.  Had issues with IUD, had to have surgically removed.  Nuvaring recently fell out, so that makes her less trusting of it.    Gets bilateral jaw pain that radiates into her face whenever she runs on the treadmill.  No CP or SOB.  Does not occur with any other type of activity.  She is concerned about heart disease.   Has issues with constipation.  When constipated, will have tearing sensation with BM and have some bleeding.  Is taking fiber gummies and is trying to push fluids.     Patient has been advised of split billing requirements and indicates understanding: Yes  Healthy Habits:     Getting at least 3 servings of Calcium per day:  Yes    Bi-annual eye exam:  Yes    Dental care twice a year:  Yes    Sleep apnea or symptoms of sleep apnea:  Daytime drowsiness    Diet:  Other    Frequency of exercise:  1 day/week    Duration of exercise:  30-45 minutes    Taking medications regularly:  Yes    Medication side effects:  None    PHQ-2 Total Score: 0    Additional concerns today:  Yes      Today's PHQ-2 Score:   PHQ-2 ( 1999 Pfizer) 3/14/2022   Q1: Little interest or pleasure in doing things 0   Q2: Feeling down, depressed or hopeless 0   PHQ-2 Score 0   PHQ-2 Total Score (12-17 Years)- Positive if 3 or more points; Administer PHQ-A if positive -   Q1: Little interest or pleasure in doing things Not at all   Q2: Feeling down, depressed or hopeless Not at all   PHQ-2 Score 0       Abuse: Current or Past (Physical, Sexual or Emotional) - No  Do you feel safe in your environment? Yes    Social History     Tobacco Use     Smoking status: Never Smoker     Smokeless tobacco: Never Used    Substance Use Topics     Alcohol use: Yes     If you drink alcohol do you typically have >3 drinks per day or >7 drinks per week? No    Alcohol Use 3/21/2022   Prescreen: >3 drinks/day or >7 drinks/week? -   Prescreen: >3 drinks/day or >7 drinks/week? No       Reviewed orders with patient.  Reviewed health maintenance and updated orders accordingly - Yes      Breast Cancer Screening:    FHS-7:   Breast CA Risk Assessment (FHS-7) 3/10/2021   Did any of your first-degree relatives have breast or ovarian cancer? No   Did any of your relatives have bilateral breast cancer? Unknown   Did any man in your family have breast cancer? No   Did any woman in your family have breast and ovarian cancer? No   Did any woman in your family have breast cancer before age 50 y? Yes- History of breast cancer in maternal grandmother when she was in late 20s or early 30s.  Denies family history in first degree relative.       Do you have 2 or more relatives with breast and/or ovarian cancer? No   Do you have 2 or more relatives with breast and/or bowel cancer? No     Patient under 40 years of age: Routine Mammogram Screening not recommended.   Pertinent mammograms are reviewed under the imaging tab.    History of abnormal Pap smear: YES - updated in Problem List and Health Maintenance accordingly  PAP / HPV Latest Ref Rng & Units 3/10/2021   PAP (Historical) - NIL   HPV16 NEG:Negative Negative   HPV18 NEG:Negative Negative   HRHPV NEG:Negative Negative     Reviewed and updated as needed this visit by clinical staff   Tobacco  Allergies  Meds  Problems  Med Hx  Surg Hx  Fam Hx          Reviewed and updated as needed this visit by Provider   Tobacco  Allergies  Meds  Problems  Med Hx  Surg Hx  Fam Hx             Review of Systems   Constitutional: Negative for chills and fever.   HENT: Negative for congestion, ear pain, hearing loss and sore throat.    Eyes: Negative for pain and visual disturbance.   Respiratory: Negative  "for cough and shortness of breath.    Cardiovascular: Negative for chest pain, palpitations and peripheral edema.   Gastrointestinal: Negative for abdominal pain, constipation, diarrhea, heartburn, hematochezia and nausea.   Breasts:  Negative for tenderness, breast mass and discharge.   Genitourinary: Positive for vaginal discharge. Negative for dysuria, frequency, genital sores, hematuria, pelvic pain, urgency and vaginal bleeding.   Musculoskeletal: Negative for arthralgias, joint swelling and myalgias.   Skin: Negative for rash.   Neurological: Negative for dizziness, weakness, headaches and paresthesias.   Psychiatric/Behavioral: Negative for mood changes. The patient is not nervous/anxious.         OBJECTIVE:   /80   Pulse 92   Temp 97.9  F (36.6  C)   Ht 1.676 m (5' 6\")   Wt 78.9 kg (174 lb)   SpO2 98%   Breastfeeding No   BMI 28.08 kg/m    Physical Exam  GENERAL: healthy, alert and no distress  EYES: Eyes grossly normal to inspection, PERRL and conjunctivae and sclerae normal  HENT: ear canals and TM's normal, nose and mouth without ulcers or lesions  NECK: no adenopathy, no asymmetry, masses, or scars and thyroid normal to palpation  RESP: lungs clear to auscultation - no rales, rhonchi or wheezes  BREAST: normal without masses, tenderness or nipple discharge and no palpable axillary masses or adenopathy  CV: regular rate and rhythm, normal S1 S2, no S3 or S4, no murmur, click or rub, no peripheral edema and peripheral pulses strong  ABDOMEN: soft, nontender, no hepatosplenomegaly, no masses and bowel sounds normal   (female): normal female external genitalia, normal urethral meatus, vaginal mucosa pink, moist, well rugated, and normal cervix/adnexa/uterus without masses or discharge  MS: no gross musculoskeletal defects noted, no edema  SKIN: no suspicious lesions or rashes  NEURO: Normal strength and tone, mentation intact and speech normal  PSYCH: mentation appears normal, affect " normal/bright    Diagnostic Test Results:  Labs reviewed in Epic    ASSESSMENT/PLAN:   (Z00.00) Routine general medical examination at a health care facility  (primary encounter diagnosis)  Comment:   Plan: continue annual exams     (Z12.4) Cervical cancer screening  Comment: due for screening  Plan: PAP screen with HPV - recommended age 30 - 65         years          (J45.20) Mild intermittent asthma without complication  Comment: Chronic, stable.   Continue current medications, doesn't need refill on prn meds yet.   Plan: BREO ELLIPTA 100-25 MCG/INH inhaler          (Z13.1) Screening for diabetes mellitus  Comment:   Plan: Glucose          (Z13.220) Lipid screening  Comment:   Plan: Lipid panel reflex to direct LDL Fasting          (Z30.015) Encounter for initial prescription of vaginal ring hormonal contraceptive  Comment: would ultimately like tubal ligation- asked her to make appointment with ob/gyn to discuss, she will continue Nuvaring for now.   Plan: etonogestrel-ethinyl estradiol (NUVARING)         0.12-0.015 MG/24HR vaginal ring          (K21.9) Gastroesophageal reflux disease without esophagitis  Comment: Chronic, stable.     Plan: omeprazole (PRILOSEC) 40 MG DR capsule          (K59.01) Slow transit constipation  Comment: tearing sensation/blood with hard stools. Possibly has fissure. Already taking fiber gummies and drinks plenty of water.   Plan: Increase fiber intake either metamucil or citrucel, or add daily Miralax.  Follow-up if not improving.      (Z80.3) Family history of malignant neoplasm of breast  Comment: grandmother had breast cancer at a very young age  Plan: Cancer Risk Mgmt/Cancer Genetic Counseling         Referral          (R61.01) Jaw pain  Comment: bilateral, only with running on the treadmill, no cp/sob/dizziness/syncope. Doesn't occur with other activities.   Plan: Advised to continue to monitor, I think this may be from impact, try alternative form of exercise- if still  "occurring, could consider stress echo    Patient has been advised of split billing requirements and indicates understanding: Yes    COUNSELING:  Reviewed preventive health counseling, as reflected in patient instructions    Estimated body mass index is 28.08 kg/m  as calculated from the following:    Height as of this encounter: 1.676 m (5' 6\").    Weight as of this encounter: 78.9 kg (174 lb).    Weight management plan: work on diet and exercise    She reports that she has never smoked. She has never used smokeless tobacco.      Counseling Resources:  ATP IV Guidelines  Pooled Cohorts Equation Calculator  Breast Cancer Risk Calculator  BRCA-Related Cancer Risk Assessment: FHS-7 Tool  FRAX Risk Assessment  ICSI Preventive Guidelines  Dietary Guidelines for Americans, 2010  USDA's MyPlate  ASA Prophylaxis  Lung CA Screening    Magdalena Gongora, CNP  [facility]  "

## 2022-03-21 NOTE — PATIENT INSTRUCTIONS
Call to schedule an appointment with OB/Gyn here in [place] to discuss possible tubal ligation.        I will be in touch with lab results.     Meds refilled.     Constipation- try adding either Metamucil or Citrucel powder with a full glass of water in the morning.  If constipation resolves and you continue to have rectal bleeding, please let me know.     Referral for genetic counseling.     Jaw pain- please try exercise other than treadmill to see if occurs in other setting- please update me.  Could consider cardiac stress echo, but I think unlikely cardiac in nature.       Preventive Health Recommendations  Female Ages 26 - 39  Yearly exam:   See your health care provider every year in order to    Review health changes.     Discuss preventive care.      Review your medicines if you your doctor has prescribed any.    Until age 30: Get a Pap test every three years (more often if you have had an abnormal result).    After age 30: Talk to your doctor about whether you should have a Pap test every 3 years or have a Pap test with HPV screening every 5 years.   You do not need a Pap test if your uterus was removed (hysterectomy) and you have not had cancer.  You should be tested each year for STDs (sexually transmitted diseases), if you're at risk.   Talk to your provider about how often to have your cholesterol checked.  If you are at risk for diabetes, you should have a diabetes test (fasting glucose).  Shots: Get a flu shot each year. Get a tetanus shot every 10 years.   Nutrition:     Eat at least 5 servings of fruits and vegetables each day.    Eat whole-grain bread, whole-wheat pasta and brown rice instead of white grains and rice.    Get adequate Calcium and Vitamin D.     Lifestyle    Exercise at least 150 minutes a week (30 minutes a day, 5 days of the week). This will help you control your weight and prevent disease.    Limit alcohol to one drink per day.    No smoking.     Wear sunscreen to prevent skin  cancer.    See your dentist every six months for an exam and cleaning.

## 2022-03-23 LAB
BKR LAB AP GYN ADEQUACY: NORMAL
BKR LAB AP GYN INTERPRETATION: NORMAL
BKR LAB AP HPV REFLEX: NORMAL
BKR LAB AP PREVIOUS ABNORMAL: NORMAL
PATH REPORT.COMMENTS IMP SPEC: NORMAL
PATH REPORT.COMMENTS IMP SPEC: NORMAL
PATH REPORT.RELEVANT HX SPEC: NORMAL

## 2022-03-25 LAB
HUMAN PAPILLOMA VIRUS 16 DNA: NEGATIVE
HUMAN PAPILLOMA VIRUS 18 DNA: NEGATIVE
HUMAN PAPILLOMA VIRUS FINAL DIAGNOSIS: NORMAL
HUMAN PAPILLOMA VIRUS OTHER HR: NEGATIVE

## 2022-05-04 ENCOUNTER — PREP FOR PROCEDURE (OUTPATIENT)
Dept: OBGYN | Facility: CLINIC | Age: 37
End: 2022-05-04
Payer: COMMERCIAL

## 2022-05-04 ENCOUNTER — OFFICE VISIT (OUTPATIENT)
Dept: OBGYN | Facility: CLINIC | Age: 37
End: 2022-05-04
Payer: COMMERCIAL

## 2022-05-04 VITALS
HEART RATE: 80 BPM | WEIGHT: 180.8 LBS | SYSTOLIC BLOOD PRESSURE: 121 MMHG | BODY MASS INDEX: 29.18 KG/M2 | DIASTOLIC BLOOD PRESSURE: 77 MMHG

## 2022-05-04 DIAGNOSIS — Z30.2 ENCOUNTER FOR STERILIZATION: Primary | ICD-10-CM

## 2022-05-04 DIAGNOSIS — Z30.09 STERILIZATION CONSULT: Primary | ICD-10-CM

## 2022-05-04 PROCEDURE — 99203 OFFICE O/P NEW LOW 30 MIN: CPT | Performed by: OBSTETRICS & GYNECOLOGY

## 2022-05-04 NOTE — PROGRESS NOTES
SUBJECTIVE:       HPI: Stephen Nye is a 36 year old  who presents today for consultation for permanent sterilization, referred from Magdalena Gongora     Patient has previously used below for contraception:  - OCPs with rash  - Depo. Irregular, prolonged bleeding  - NuvaRing. Partner did not like but also restarted. Has displaced as well.  - IUD. Had to be surgically removed; followed by accidental removal    Today, she requests tubal ligation for sterilization due to unwanted SE from contraception, desires sterilization. She does not desire future childbearing and has been thinking about this decision for awhile now.      Ob Hx:    Gyn Hx: Patient's last menstrual period was 2022 (exact date).     Last pap was 3/21/22 nil neg hpv, Hx HPV s/p cryo in 2006. Next pap due 3/2027  Family history of gyn-related malignancies: denies         reports that she has never smoked. She has never used smokeless tobacco.      Today's PHQ-2 Score:   PHQ-2 (  Pfizer) 3/14/2022   Q1: Little interest or pleasure in doing things 0   Q2: Feeling down, depressed or hopeless 0   PHQ-2 Score 0   PHQ-2 Total Score (12-17 Years)- Positive if 3 or more points; Administer PHQ-A if positive -   Q1: Little interest or pleasure in doing things Not at all   Q2: Feeling down, depressed or hopeless Not at all   PHQ-2 Score 0     Today's PHQ-9 Score: No flowsheet data found.  Today's TAYLOR-7 Score: No flowsheet data found.    Problem list and histories reviewed & adjusted, as indicated.  Additional history: as documented.    Patient Active Problem List   Diagnosis     Chronic right shoulder pain     Mild intermittent asthma without complication     Gastroesophageal reflux disease without esophagitis     Hx of abnormal cervical Pap smear     Past Surgical History:   Procedure Laterality Date     LASIK  2011      Social History     Tobacco Use     Smoking status: Never Smoker     Smokeless tobacco: Never Used    Substance Use Topics     Alcohol use: Yes      Problem (# of Occurrences) Relation (Name,Age of Onset)    Asthma (1) Mother    Attention Deficit Disorder (1) Brother    Depression (1) Brother    Hyperlipidemia (1) Mother    Hypertension (1) Mother    Migraines (1) Brother    Transient ischemic attack (1) Mother       Negative family history of: Glaucoma, Macular Degeneration            albuterol (PROVENTIL) (2.5 MG/3ML) 0.083% neb solution, Take 2.5 mg by nebulization every 6 hours as needed for shortness of breath / dyspnea or wheezing  BLACK CURRANT SEED OIL PO,   BREO ELLIPTA 100-25 MCG/INH inhaler, Inhale 1 puff into the lungs daily  Calcium Carbonate-Vit D-Min (CALCIUM 1200 PO), Take 2 capfuls by mouth daily  diphenhydrAMINE (BENADRYL) 25 MG tablet, Take 25 mg by mouth daily  etonogestrel-ethinyl estradiol (NUVARING) 0.12-0.015 MG/24HR vaginal ring, Leave ring in place for 3 weeks, then remove for 1 week.  FIBER SELECT GUMMIES PO,   fluticasone (FLONASE) 50 MCG/ACT nasal spray, Spray 1 spray in nostril 2 times daily  folic acid (FOLVITE) 400 MCG tablet, Take 400 mcg by mouth daily  Lactobacillus Acid-Pectin (LACTOBACILLUS ACIDOPHILUS) TABS, Take 1 tablet by mouth 3 times daily (before meals)  loratadine (CLARITIN) 10 MG tablet, Take 10 mg by mouth daily  omeprazole (PRILOSEC) 40 MG DR capsule, Take 1 capsule (40 mg) by mouth daily  Prenatal Vit-Fe Fumarate-FA (PRENATAL MULTIVITAMIN  PLUS IRON) 27-1 MG TABS, Take by mouth daily  sodium chloride (PEREZ 128) 5 % ophthalmic ointment, Apply 1/4 inch to right eye at bedtime for 2-3 months  VENTOLIN  (90 Base) MCG/ACT inhaler, 2 puffs prn    No current facility-administered medications on file prior to visit.    Allergies   Allergen Reactions     Aloe Vera [Aloe]      Banana      Burning and ithching     Biaxin [Clarithromycin]      GI issues     Latex Itching     Nickel      rash     Ortho Micronor [Norethindrone (Contraceptive)]      Ortho tri-cyclen lo      Seasonal Allergies        ROS:  10 Point review of systems negative other noted above in HPI    OBJECTIVE:     /77   Pulse 80   Wt 82 kg (180 lb 12.8 oz)   LMP 2022 (Exact Date)   BMI 29.18 kg/m    Body mass index is 29.18 kg/m .      Gen: Alert, oriented, appropriately interactive, NAD  Chest: Symmetrical, unlabored breathing  Abdomen: soft, non tender, non distended, no masses, no hernias  Pelvic: Deferred  MSK: normal gait, symmetric movements UE & LE      In-Clinic Test Results:  No results found for this or any previous visit (from the past 24 hour(s)).    ASSESSMENT/PLAN:                                                      Stephen Nye is a 36 year old   who presents today for consultation for permanent sterilization, referred from Magdalena Gongora      ICD-10-CM    1. Sterilization consult  Z30.09        We discussed all forms of birth control available, including LARC therapy (IUD, Nexplanon), Depo Provera, OCPs, NuvaRing or the patch, condoms and permanent sterilization in form of tubal ligation or vasectomy. She has decided that she is not interested in medical contraception, requesting permanent sterilization. We discussed that standard of care would be to perform a laparoscopic bilateral salpingectomy as studies have shown a reduction of risk of ovarian cancer. Details of the procedure were discussed with the patient.  Risks include, but are not limited to, bleeding, infection, and injury to surrounding organs such as the bowel, urinary system, nerves, and blood vessels.  Injury may result in repair at the time of the surgery or in a separate procedure.  All questions answered, and accepting these risks, the patient elects to proceed with the procedure. Case request placed. FTP signed today however suspect not needed for her insurance carrier.  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 Will let surgery scheduling know request although discussed  that there may be challenges setting up surgery this month given high case load at the ASC/main OR. All questions answered.      Thank you for allowing me to participate in the care of your patient.         Estefani Martínez [facility]

## 2022-05-04 NOTE — PATIENT INSTRUCTIONS
If you have any questions regarding your visit, Please contact your care team.    [employer] [facility]: [phone]      [facility] CLINIC HOURS TELEPHONE NUMBER   Estefanianshul Martínez DO.    LEANN Dorado -  [place] -       Suri RN  Yasmine, RN  ONEYDA White     Monday, Thursday  [place]  7am-3pm    Tuesday, Wednesday  [place]  7am-3pm    E/O Friday & 3rd Wednesday  [facility]  [address] [place] [address]  [phone] ask for [facility]    Imaging [phone]       Urgent Care locations:  [facility] Saturday and Sunday   9 am - 5 pm    Monday-Friday   12 pm - 8 pm  Saturday and Sunday   9 am - 5 pm   [phone]     [facility] Labor and Delivery:  [phone]    If you need a medication refill, please contact your pharmacy. Please allow 3 business days for your refill to be completed.  As always, Thank you for trusting us with your healthcare needs!

## 2022-05-05 ENCOUNTER — TELEPHONE (OUTPATIENT)
Dept: OBGYN | Facility: CLINIC | Age: 37
End: 2022-05-05
Payer: COMMERCIAL

## 2022-05-05 DIAGNOSIS — Z11.52 ENCOUNTER FOR PREOPERATIVE SCREENING LABORATORY TESTING FOR COVID-19 VIRUS: Primary | ICD-10-CM

## 2022-05-05 DIAGNOSIS — Z01.812 ENCOUNTER FOR PREOPERATIVE SCREENING LABORATORY TESTING FOR COVID-19 VIRUS: Primary | ICD-10-CM

## 2022-05-05 PROBLEM — Z30.2 ENCOUNTER FOR STERILIZATION: Status: ACTIVE | Noted: 2022-05-05

## 2022-05-05 NOTE — TELEPHONE ENCOUNTER
stephen Nye  Female, 36 year old, 1985    MRN:   [id number]  Phone:   [phone] (M)        PCP:   Magdalena Gongora CNP  Coverage:   [employer]      Estefani Martínez: Case request order has been signed for Stephen Nye (CaseID: [id number])  Received: Yesterday  Estefani Martínez DO  P [facility] Obgyn Surg Sched Pool  Patient Name: Stephen Nye   MRN: [id number]   Case#: [id number]   Surgeon(s) and Role:      * Estefani Martínez DO - Primary   Date requested: * No dates entered *   Location:  OR   Procedure(s):   SALPINGECTOMY, LAPAROSCOPIC BILATERAL (N/A)     SURGERY SCHEDULING AND PRECERTIFICATION    Medical Record Number: [id number]  Stephen Nye  YOB: 1985   Phone: [phone] (home)   Primary Provider: Magdalena Gongora    Reason for Admit: desires for sterilization   ICD-10 CODE:  034.211     Surgeon: Estefani Martínez DO  Surgical Procedure: laparoscopic salpingectomy bilateral    Date of Surgery 05/19/2022 Time of Surgery 10:30 am   Surgery to be performed at:  [facility]   Status: Outpatient  Type of Anesthesia Anticipated: Local with MAC    Sterilization consent:  Yes and was signed on 05/04/22.    Pre-Op: On 05/17/2022 with DEEJAY Gongora in [place]  at 1:10 pm  COVID testing:  The Covid test has been scheduled for 05/17/20225 at 2:15 pm at [place] [facility] .  Post-Op:  2 weeks on 06/21/2022 with Dr. Martínez at [place]  at 9:00 am     Pre-certification routed to Financial Counselors:  Yes    Surgery packet mailed to patient's home address: Yes  Patient instructed NPO 12 hours prior to surgery, arrive according to the time the nurse gives patient when called prior to surgery, must have a .  Patient understood and agrees to the plan.      Requestor:  Frida Trinh     Location:  [place] [phone]

## 2022-05-17 ENCOUNTER — OFFICE VISIT (OUTPATIENT)
Dept: FAMILY MEDICINE | Facility: CLINIC | Age: 37
End: 2022-05-17
Payer: COMMERCIAL

## 2022-05-17 VITALS
OXYGEN SATURATION: 96 % | BODY MASS INDEX: 28.93 KG/M2 | SYSTOLIC BLOOD PRESSURE: 114 MMHG | HEART RATE: 70 BPM | TEMPERATURE: 97 F | HEIGHT: 66 IN | DIASTOLIC BLOOD PRESSURE: 75 MMHG | WEIGHT: 180 LBS

## 2022-05-17 DIAGNOSIS — Z01.812 ENCOUNTER FOR PREOPERATIVE SCREENING LABORATORY TESTING FOR COVID-19 VIRUS: ICD-10-CM

## 2022-05-17 DIAGNOSIS — R11.2 POSTOPERATIVE NAUSEA AND VOMITING: ICD-10-CM

## 2022-05-17 DIAGNOSIS — Z30.2 ENCOUNTER FOR STERILIZATION: ICD-10-CM

## 2022-05-17 DIAGNOSIS — Z11.52 ENCOUNTER FOR PREOPERATIVE SCREENING LABORATORY TESTING FOR COVID-19 VIRUS: ICD-10-CM

## 2022-05-17 DIAGNOSIS — Z98.890 POSTOPERATIVE NAUSEA AND VOMITING: ICD-10-CM

## 2022-05-17 DIAGNOSIS — Z01.818 PREOP GENERAL PHYSICAL EXAM: Primary | ICD-10-CM

## 2022-05-17 LAB — HGB BLD-MCNC: 13.5 G/DL (ref 11.7–15.7)

## 2022-05-17 PROCEDURE — 36415 COLL VENOUS BLD VENIPUNCTURE: CPT | Performed by: NURSE PRACTITIONER

## 2022-05-17 PROCEDURE — U0005 INFEC AGEN DETEC AMPLI PROBE: HCPCS | Performed by: NURSE PRACTITIONER

## 2022-05-17 PROCEDURE — 99214 OFFICE O/P EST MOD 30 MIN: CPT | Performed by: NURSE PRACTITIONER

## 2022-05-17 PROCEDURE — 85018 HEMOGLOBIN: CPT | Performed by: NURSE PRACTITIONER

## 2022-05-17 PROCEDURE — U0003 INFECTIOUS AGENT DETECTION BY NUCLEIC ACID (DNA OR RNA); SEVERE ACUTE RESPIRATORY SYNDROME CORONAVIRUS 2 (SARS-COV-2) (CORONAVIRUS DISEASE [COVID-19]), AMPLIFIED PROBE TECHNIQUE, MAKING USE OF HIGH THROUGHPUT TECHNOLOGIES AS DESCRIBED BY CMS-2020-01-R: HCPCS | Performed by: NURSE PRACTITIONER

## 2022-05-17 RX ORDER — SCOLOPAMINE TRANSDERMAL SYSTEM 1 MG/1
1 PATCH, EXTENDED RELEASE TRANSDERMAL
Qty: 2 PATCH | Refills: 0 | Status: SHIPPED | OUTPATIENT
Start: 2022-05-17 | End: 2022-06-21

## 2022-05-17 ASSESSMENT — ASTHMA QUESTIONNAIRES: ACT_TOTALSCORE: 24

## 2022-05-17 NOTE — PROGRESS NOTES
[facility]  [id number] [facility] [address]  Phone: [phone]  Primary Provider: Magdalena Gongora  Pre-op Performing Provider: MAGDALENA GONGORA      PREOPERATIVE EVALUATION:  Today's date: 5/17/2022    Stephen Nye is a 36 year old female who presents for a preoperative evaluation.    Surgical Information:  Surgery/Procedure::SALPINGECTOMY, LAPAROSCOPIC BILATERAL   Surgery Location:   Surgeon: Estefani red  Surgery Date: 5/19/2022  Time of Surgery: TBD  Where patient plans to recover: At home with family  Fax number for surgical facility: Note does not need to be faxed, will be available electronically in Epic.    Type of Anesthesia Anticipated: to be determined    Assessment & Plan     The proposed surgical procedure is considered INTERMEDIATE risk.    Preop general physical exam  - Hemoglobin    Encounter for sterilization    Postoperative nausea and vomiting  Reports hx of significant postoperative nausea and vomiting.  For her last surgery, used scopolamine for 2 days prior to surgery and day of, worked well.    - scopolamine (TRANSDERM) 1 MG/3DAYS 72 hr patch; Place 1 patch onto the skin every 72 hours    Encounter for preoperative screening laboratory testing for COVID-19 virus  - Asymptomatic COVID-19 Virus (Coronavirus) by PCR Nose         Risks and Recommendations:  The patient has the following additional risks and recommendations for perioperative complications:   - No identified additional risk factors other than previously addressed    Medication Instructions:  Patient is to take all scheduled medications on the day of surgery    RECOMMENDATION:  APPROVAL GIVEN to proceed with proposed procedure, without further diagnostic evaluation.      Subjective     HPI related to upcoming procedure: Stephen Nye is 37 yo female who presents for a preoperative physical for planned laparoscopic bilateral salpingectomy for the purpose of sterilization.        Preop Questions 5/17/2022   1. Have you ever had a heart attack or stroke? No   2. Have you ever had surgery on your heart or blood vessels, such as a stent placement, a coronary artery bypass, or surgery on an artery in your head, neck, heart, or legs? No   3. Do you have chest pain with activity? No   4. Do you have a history of  heart failure? No   5. Do you currently have a cold, bronchitis or symptoms of other infection? No   6. Do you have a cough, shortness of breath, or wheezing? No   7. Do you or anyone in your family have previous history of blood clots? YES - dad has a hx of DVT, no clotting disorders.  Patient denies personal history.      8. Do you or does anyone in your family have a serious bleeding problem such as prolonged bleeding following surgeries or cuts? No   9. Have you ever had problems with anemia or been told to take iron pills? YES - reports hx of anemia, taking iron daily   10. Have you had any abnormal blood loss such as black, tarry or bloody stools, or abnormal vaginal bleeding? YES - hx of vaginal bleeding when taking depo, since resolved.      11. Have you ever had a blood transfusion? No   12. Are you willing to have a blood transfusion if it is medically needed before, during, or after your surgery? Yes   13. Have you or any of your relatives ever had problems with anesthesia? Patient reports hx of significant postoperative nausea that lasted for days   14. Do you have sleep apnea, excessive snoring or daytime drowsiness? No   15. Do you have any artifical heart valves or other implanted medical devices like a pacemaker, defibrillator, or continuous glucose monitor? No   16. Do you have artificial joints? No   17. Are you allergic to latex? YES: makes her itch.      18. Is there any chance that you may be pregnant? No       Health Care Directive:  Patient does not have a Health Care Directive or Living Will: Discussed advance care planning with patient; however, patient  "declined at this time.    Preoperative Review of :   reviewed - no record of controlled substances prescribed.      Status of Chronic Conditions:  See problem list for active medical problems.  Problems all longstanding and stable, except as noted/documented.  See ROS for pertinent symptoms related to these conditions.      Review of Systems  CONSTITUTIONAL: NEGATIVE for fever, chills, change in weight  INTEGUMENTARY/SKIN: NEGATIVE for worrisome rashes, moles or lesions  EYES: NEGATIVE for vision changes or irritation  ENT/MOUTH: NEGATIVE for ear, mouth and throat problems  RESP: NEGATIVE for significant cough or SOB  CV: NEGATIVE for chest pain, palpitations or peripheral edema  GI: NEGATIVE for nausea, abdominal pain, heartburn, or change in bowel habits  : NEGATIVE for frequency, dysuria, or hematuria  MUSCULOSKELETAL: NEGATIVE for significant arthralgias or myalgia  NEURO: NEGATIVE for weakness, dizziness or paresthesias  ENDOCRINE: NEGATIVE for temperature intolerance, skin/hair changes  HEME: NEGATIVE for bleeding problems  PSYCHIATRIC: NEGATIVE for changes in mood or affect    Patient Active Problem List    Diagnosis Date Noted     Encounter for sterilization 05/05/2022     Priority: Medium     Added automatically from request for surgery [id number]       Hx of abnormal cervical Pap smear 03/19/2021     Priority: Medium     \"Patient states history of abnormal pap and HPV positive in 2006.  Had cryo procedure following that and has had normal pap/hpv since.  She believes last pap was 2019.\" - 3/10/2021 office visit notes    3/10/21 NIL pap, neg HPV. Plan cotest in 1 year based on abnormal history per patient report. No pap records available to review.  3/21/22 NIL pap, neg HPV. Plan: cotest in 3 years       Chronic right shoulder pain 09/17/2020     Priority: Medium     Mild intermittent asthma without complication 09/17/2020     Priority: Medium     Gastroesophageal reflux disease without esophagitis " "09/17/2020     Priority: Medium      Past Medical History:   Diagnosis Date     Appendicitis 1996     H/O total vaginal hysterectomy 2013     HTN (hypertension)      Hx of abnormal cervical Pap smear 3/19/2021    \"Patient states history of abnormal pap and HPV positive in 2006.  Had cryo procedure following that and has had normal pap/hpv since.  She believes last pap was 2019.\" - 3/10/2021 office visit notes  3/10/2021 NIL pap, neg HPV. Plan cotest in 1 year based on +abnormal history per patient report. No pap records available to review.     S/P tonsillectomy and adenoidectomy 1996     Past Surgical History:   Procedure Laterality Date     LASIK  11/2011     Current Outpatient Medications   Medication Sig Dispense Refill     albuterol (PROVENTIL) (2.5 MG/3ML) 0.083% neb solution Take 2.5 mg by nebulization every 6 hours as needed for shortness of breath / dyspnea or wheezing       BLACK CURRANT SEED OIL PO        BREO ELLIPTA 100-25 MCG/INH inhaler Inhale 1 puff into the lungs daily 3 each 3     Calcium Carbonate-Vit D-Min (CALCIUM 1200 PO) Take 2 capfuls by mouth daily       diphenhydrAMINE (BENADRYL) 25 MG tablet Take 25 mg by mouth daily       etonogestrel-ethinyl estradiol (NUVARING) 0.12-0.015 MG/24HR vaginal ring Leave ring in place for 3 weeks, then remove for 1 week. 3 each 3     FIBER SELECT GUMMIES PO        fluticasone (FLONASE) 50 MCG/ACT nasal spray Spray 1 spray in nostril 2 times daily 47.7 mL 3     folic acid (FOLVITE) 400 MCG tablet Take 400 mcg by mouth daily       Lactobacillus Acid-Pectin (LACTOBACILLUS ACIDOPHILUS) TABS Take 1 tablet by mouth 3 times daily (before meals)       loratadine (CLARITIN) 10 MG tablet Take 10 mg by mouth daily       omeprazole (PRILOSEC) 40 MG DR capsule Take 1 capsule (40 mg) by mouth daily 90 capsule 3     Prenatal Vit-Fe Fumarate-FA (PRENATAL MULTIVITAMIN  PLUS IRON) 27-1 MG TABS Take by mouth daily       scopolamine (TRANSDERM) 1 MG/3DAYS 72 hr patch Place 1 " "patch onto the skin every 72 hours 2 patch 0     sodium chloride (PEREZ 128) 5 % ophthalmic ointment Apply 1/4 inch to right eye at bedtime for 2-3 months 3.5 g 3     VENTOLIN  (90 Base) MCG/ACT inhaler 2 puffs prn         Allergies   Allergen Reactions     Aloe Vera [Aloe]      Banana      Burning and ithching     Biaxin [Clarithromycin]      GI issues     Latex Itching     Nickel      rash     Ortho Micronor [Norethindrone (Contraceptive)]      Ortho tri-cyclen lo     Seasonal Allergies         Social History     Tobacco Use     Smoking status: Never Smoker     Smokeless tobacco: Never Used   Substance Use Topics     Alcohol use: Yes       History   Drug Use Unknown         Objective     /75   Pulse 70   Temp 97  F (36.1  C)   Ht 1.676 m (5' 6\")   Wt 81.6 kg (180 lb)   LMP 04/23/2022 (Exact Date)   SpO2 96%   BMI 29.05 kg/m      Physical Exam    GENERAL APPEARANCE: healthy, alert and no distress     EYES: EOMI, PERRL     HENT: nose and mouth without ulcers or lesions     NECK: no adenopathy, no asymmetry, masses, or scars and thyroid normal to palpation     RESP: lungs clear to auscultation - no rales, rhonchi or wheezes     CV: regular rates and rhythm, normal S1 S2, no S3 or S4 and no murmur, click or rub     ABDOMEN:  soft, nontender, no HSM or masses and bowel sounds normal     MS: extremities normal- no gross deformities noted, no evidence of inflammation in joints, FROM in all extremities.     SKIN: no suspicious lesions or rashes     NEURO: Normal strength and tone, sensory exam grossly normal, mentation intact and speech normal     PSYCH: mentation appears normal. and affect normal/bright     LYMPHATICS: No cervical adenopathy    Recent Labs   Lab Test 10/02/20  1611   HGB 13.7         POTASSIUM 3.7   CR 1.00        Diagnostics:    Hemoglobin   Date Value Ref Range Status   05/17/2022 13.5 11.7 - 15.7 g/dL Final   10/02/2020 13.7 11.7 - 15.7 g/dL Final      No EKG required, " no history of coronary heart disease, significant arrhythmia, peripheral arterial disease or other structural heart disease.    Revised Cardiac Risk Index (RCRI):  The patient has the following serious cardiovascular risks for perioperative complications:   - No serious cardiac risks = 0 points     RCRI Interpretation: 0 points: Class I (very low risk - 0.4% complication rate)           Signed Electronically by: Magdalena Gongora CNP  Copy of this evaluation report is provided to requesting physician.

## 2022-05-17 NOTE — PATIENT INSTRUCTIONS
Preparing for Your Surgery  Getting started  A nurse will call you to review your health history and instructions. They will give you an arrival time based on your scheduled surgery time. Please be ready to share:    Your doctor's clinic name and phone number    Your medical, surgical and anesthesia history    A list of allergies and sensitivities    A list of medicines, including herbal treatments and over-the-counter drugs    Whether the patient has a legal guardian (ask how to send us the papers in advance)  Please tell us if you're pregnant--or if there's any chance you might be pregnant. Some surgeries may injure a fetus (unborn baby), so they require a pregnancy test. Surgeries that are safe for a fetus don't always need a test, and you can choose whether to have one.   If you have a child who's having surgery, please ask for a copy of Preparing for Your Child's Surgery.    Preparing for surgery    Within 30 days of surgery: Have a pre-op exam (sometimes called an H&P, or History and Physical). This can be done at a clinic or pre-operative center.  ? If you're having a , you may not need this exam. Talk to your care team.    At your pre-op exam, talk to your care team about all medicines you take. If you need to stop any medicines before surgery, ask when to start taking them again.  ? We do this for your safety. Many medicines can make you bleed too much during surgery. Some change how well surgery (anesthesia) drugs work.    Call your insurance company to let them know you're having surgery. (If you don't have insurance, call [phone].)    Call your clinic if there's any change in your health. This includes signs of a cold or flu (sore throat, runny nose, cough, rash, fever). It also includes a scrape or scratch near the surgery site.    If you have questions on the day of surgery, call your hospital or surgery center.  COVID testing  You may need to be tested for COVID-19 before having  surgery. If so, we will give you instructions.  Eating and drinking guidelines  For your safety: Unless your surgeon tells you otherwise, follow the guidelines below.    Eat and drink as usual until 8 hours before surgery. After that, no food or milk.    Drink clear liquids until 2 hours before surgery. These are liquids you can see through, like water, Gatorade and Propel Water. You may also have black coffee and tea (no cream or milk).    Nothing by mouth within 2 hours of surgery. This includes gum, candy and breath mints.    If you drink alcohol: Stop drinking it the night before surgery.    If your care team tells you to take medicine on the morning of surgery, it's okay to take it with a sip of water.  Preventing infection    Shower or bathe the night before and morning of your surgery. Follow the instructions your clinic gave you. (If no instructions, use regular soap.)    Don't shave or clip hair near your surgery site. We'll remove the hair if needed.    Don't smoke or vape the morning of surgery. You may chew nicotine gum up to 2 hours before surgery. A nicotine patch is okay.  ? Note: Some surgeries require you to completely quit smoking and nicotine. Check with your surgeon.    Your care team will make every effort to keep you safe from infection. We will:  ? Clean our hands often with soap and water (or an alcohol-based hand rub).  ? Clean the skin at your surgery site with a special soap that kills germs.  ? Give you a special gown to keep you warm. (Cold raises the risk of infection.)  ? Wear special hair covers, masks, gowns and gloves during surgery.  ? Give antibiotic medicine, if prescribed. Not all surgeries need antibiotics.  What to bring on the day of surgery    Photo ID and insurance card    Copy of your health care directive, if you have one    Glasses and hearing aides (bring cases)  ? You can't wear contacts during surgery    Inhaler and eye drops, if you use them (tell us about these when  you arrive)    CPAP machine or breathing device, if you use them    A few personal items, if spending the night    If you have . . .  ? A pacemaker, ICD (cardiac defibrillator) or other implant: Bring the ID card.  ? An implanted stimulator: Bring the remote control.  ? A legal guardian: Bring a copy of the certified (court-stamped) guardianship papers.  Please remove any jewelry, including body piercings. Leave jewelry and other valuables at home.  If you're going home the day of surgery    You must have a responsible adult drive you home. They should stay with you overnight as well.    If you don't have someone to stay with you, and you aren't safe to go home alone, we may keep you overnight. Insurance often won't pay for this.  After surgery  If it's hard to control your pain or you need more pain medicine, please call your surgeon's office.  Questions?   If you have any questions for your care team, list them here: _________________________________________________________________________________________________________________________________________________________________________ ____________________________________ ____________________________________ ____________________________________  For informational purposes only. Not to replace the advice of your health care provider. Copyright   2003, 2019 [facility]. All rights reserved. Clinically reviewed by Gila Han MD. [employer] [facility] [id number] - REV 07/21.

## 2022-05-17 NOTE — H&P (VIEW-ONLY)
[facility]  [address] [facility] [address]  Phone: [phone]  Primary Provider: Magdalena Gongora  Pre-op Performing Provider: MAGDALENA GONGORA      PREOPERATIVE EVALUATION:  Today's date: 5/17/2022    Stephen Nye is a 36 year old female who presents for a preoperative evaluation.    Surgical Information:  Surgery/Procedure::SALPINGECTOMY, LAPAROSCOPIC BILATERAL   Surgery Location:   Surgeon: Estefani red  Surgery Date: 5/19/2022  Time of Surgery: TBD  Where patient plans to recover: At home with family  Fax number for surgical facility: Note does not need to be faxed, will be available electronically in Epic.    Type of Anesthesia Anticipated: to be determined    Assessment & Plan     The proposed surgical procedure is considered INTERMEDIATE risk.    Preop general physical exam  - Hemoglobin    Encounter for sterilization    Postoperative nausea and vomiting  Reports hx of significant postoperative nausea and vomiting.  For her last surgery, used scopolamine for 2 days prior to surgery and day of, worked well.    - scopolamine (TRANSDERM) 1 MG/3DAYS 72 hr patch; Place 1 patch onto the skin every 72 hours    Encounter for preoperative screening laboratory testing for COVID-19 virus  - Asymptomatic COVID-19 Virus (Coronavirus) by PCR Nose         Risks and Recommendations:  The patient has the following additional risks and recommendations for perioperative complications:   - No identified additional risk factors other than previously addressed    Medication Instructions:  Patient is to take all scheduled medications on the day of surgery    RECOMMENDATION:  APPROVAL GIVEN to proceed with proposed procedure, without further diagnostic evaluation.      Subjective     HPI related to upcoming procedure: Stephen Nye is 37 yo female who presents for a preoperative physical for planned laparoscopic bilateral salpingectomy for the purpose of sterilization.        Preop Questions 5/17/2022   1. Have you ever had a heart attack or stroke? No   2. Have you ever had surgery on your heart or blood vessels, such as a stent placement, a coronary artery bypass, or surgery on an artery in your head, neck, heart, or legs? No   3. Do you have chest pain with activity? No   4. Do you have a history of  heart failure? No   5. Do you currently have a cold, bronchitis or symptoms of other infection? No   6. Do you have a cough, shortness of breath, or wheezing? No   7. Do you or anyone in your family have previous history of blood clots? YES - dad has a hx of DVT, no clotting disorders.  Patient denies personal history.      8. Do you or does anyone in your family have a serious bleeding problem such as prolonged bleeding following surgeries or cuts? No   9. Have you ever had problems with anemia or been told to take iron pills? YES - reports hx of anemia, taking iron daily   10. Have you had any abnormal blood loss such as black, tarry or bloody stools, or abnormal vaginal bleeding? YES - hx of vaginal bleeding when taking depo, since resolved.      11. Have you ever had a blood transfusion? No   12. Are you willing to have a blood transfusion if it is medically needed before, during, or after your surgery? Yes   13. Have you or any of your relatives ever had problems with anesthesia? Patient reports hx of significant postoperative nausea that lasted for days   14. Do you have sleep apnea, excessive snoring or daytime drowsiness? No   15. Do you have any artifical heart valves or other implanted medical devices like a pacemaker, defibrillator, or continuous glucose monitor? No   16. Do you have artificial joints? No   17. Are you allergic to latex? YES: makes her itch.      18. Is there any chance that you may be pregnant? No       Health Care Directive:  Patient does not have a Health Care Directive or Living Will: Discussed advance care planning with patient; however, patient  "declined at this time.    Preoperative Review of :   reviewed - no record of controlled substances prescribed.      Status of Chronic Conditions:  See problem list for active medical problems.  Problems all longstanding and stable, except as noted/documented.  See ROS for pertinent symptoms related to these conditions.      Review of Systems  CONSTITUTIONAL: NEGATIVE for fever, chills, change in weight  INTEGUMENTARY/SKIN: NEGATIVE for worrisome rashes, moles or lesions  EYES: NEGATIVE for vision changes or irritation  ENT/MOUTH: NEGATIVE for ear, mouth and throat problems  RESP: NEGATIVE for significant cough or SOB  CV: NEGATIVE for chest pain, palpitations or peripheral edema  GI: NEGATIVE for nausea, abdominal pain, heartburn, or change in bowel habits  : NEGATIVE for frequency, dysuria, or hematuria  MUSCULOSKELETAL: NEGATIVE for significant arthralgias or myalgia  NEURO: NEGATIVE for weakness, dizziness or paresthesias  ENDOCRINE: NEGATIVE for temperature intolerance, skin/hair changes  HEME: NEGATIVE for bleeding problems  PSYCHIATRIC: NEGATIVE for changes in mood or affect    Patient Active Problem List    Diagnosis Date Noted     Encounter for sterilization 05/05/2022     Priority: Medium     Added automatically from request for surgery [id number]       Hx of abnormal cervical Pap smear 03/19/2021     Priority: Medium     \"Patient states history of abnormal pap and HPV positive in 2006.  Had cryo procedure following that and has had normal pap/hpv since.  She believes last pap was 2019.\" - 3/10/2021 office visit notes    3/10/21 NIL pap, neg HPV. Plan cotest in 1 year based on abnormal history per patient report. No pap records available to review.  3/21/22 NIL pap, neg HPV. Plan: cotest in 3 years       Chronic right shoulder pain 09/17/2020     Priority: Medium     Mild intermittent asthma without complication 09/17/2020     Priority: Medium     Gastroesophageal reflux disease without esophagitis " "09/17/2020     Priority: Medium      Past Medical History:   Diagnosis Date     Appendicitis 1996     H/O total vaginal hysterectomy 2013     HTN (hypertension)      Hx of abnormal cervical Pap smear 3/19/2021    \"Patient states history of abnormal pap and HPV positive in 2006.  Had cryo procedure following that and has had normal pap/hpv since.  She believes last pap was 2019.\" - 3/10/2021 office visit notes  3/10/2021 NIL pap, neg HPV. Plan cotest in 1 year based on +abnormal history per patient report. No pap records available to review.     S/P tonsillectomy and adenoidectomy 1996     Past Surgical History:   Procedure Laterality Date     LASIK  11/2011     Current Outpatient Medications   Medication Sig Dispense Refill     albuterol (PROVENTIL) (2.5 MG/3ML) 0.083% neb solution Take 2.5 mg by nebulization every 6 hours as needed for shortness of breath / dyspnea or wheezing       BLACK CURRANT SEED OIL PO        BREO ELLIPTA 100-25 MCG/INH inhaler Inhale 1 puff into the lungs daily 3 each 3     Calcium Carbonate-Vit D-Min (CALCIUM 1200 PO) Take 2 capfuls by mouth daily       diphenhydrAMINE (BENADRYL) 25 MG tablet Take 25 mg by mouth daily       etonogestrel-ethinyl estradiol (NUVARING) 0.12-0.015 MG/24HR vaginal ring Leave ring in place for 3 weeks, then remove for 1 week. 3 each 3     FIBER SELECT GUMMIES PO        fluticasone (FLONASE) 50 MCG/ACT nasal spray Spray 1 spray in nostril 2 times daily 47.7 mL 3     folic acid (FOLVITE) 400 MCG tablet Take 400 mcg by mouth daily       Lactobacillus Acid-Pectin (LACTOBACILLUS ACIDOPHILUS) TABS Take 1 tablet by mouth 3 times daily (before meals)       loratadine (CLARITIN) 10 MG tablet Take 10 mg by mouth daily       omeprazole (PRILOSEC) 40 MG DR capsule Take 1 capsule (40 mg) by mouth daily 90 capsule 3     Prenatal Vit-Fe Fumarate-FA (PRENATAL MULTIVITAMIN  PLUS IRON) 27-1 MG TABS Take by mouth daily       scopolamine (TRANSDERM) 1 MG/3DAYS 72 hr patch Place 1 " "patch onto the skin every 72 hours 2 patch 0     sodium chloride (PEREZ 128) 5 % ophthalmic ointment Apply 1/4 inch to right eye at bedtime for 2-3 months 3.5 g 3     VENTOLIN  (90 Base) MCG/ACT inhaler 2 puffs prn         Allergies   Allergen Reactions     Aloe Vera [Aloe]      Banana      Burning and ithching     Biaxin [Clarithromycin]      GI issues     Latex Itching     Nickel      rash     Ortho Micronor [Norethindrone (Contraceptive)]      Ortho tri-cyclen lo     Seasonal Allergies         Social History     Tobacco Use     Smoking status: Never Smoker     Smokeless tobacco: Never Used   Substance Use Topics     Alcohol use: Yes       History   Drug Use Unknown         Objective     /75   Pulse 70   Temp 97  F (36.1  C)   Ht 1.676 m (5' 6\")   Wt 81.6 kg (180 lb)   LMP 04/23/2022 (Exact Date)   SpO2 96%   BMI 29.05 kg/m      Physical Exam    GENERAL APPEARANCE: healthy, alert and no distress     EYES: EOMI, PERRL     HENT: nose and mouth without ulcers or lesions     NECK: no adenopathy, no asymmetry, masses, or scars and thyroid normal to palpation     RESP: lungs clear to auscultation - no rales, rhonchi or wheezes     CV: regular rates and rhythm, normal S1 S2, no S3 or S4 and no murmur, click or rub     ABDOMEN:  soft, nontender, no HSM or masses and bowel sounds normal     MS: extremities normal- no gross deformities noted, no evidence of inflammation in joints, FROM in all extremities.     SKIN: no suspicious lesions or rashes     NEURO: Normal strength and tone, sensory exam grossly normal, mentation intact and speech normal     PSYCH: mentation appears normal. and affect normal/bright     LYMPHATICS: No cervical adenopathy    Recent Labs   Lab Test 10/02/20  1611   HGB 13.7         POTASSIUM 3.7   CR 1.00        Diagnostics:    Hemoglobin   Date Value Ref Range Status   05/17/2022 13.5 11.7 - 15.7 g/dL Final   10/02/2020 13.7 11.7 - 15.7 g/dL Final      No EKG required, " no history of coronary heart disease, significant arrhythmia, peripheral arterial disease or other structural heart disease.    Revised Cardiac Risk Index (RCRI):  The patient has the following serious cardiovascular risks for perioperative complications:   - No serious cardiac risks = 0 points     RCRI Interpretation: 0 points: Class I (very low risk - 0.4% complication rate)           Signed Electronically by: Magdalena Gongora CNP  Copy of this evaluation report is provided to requesting physician.

## 2022-05-18 ENCOUNTER — ANESTHESIA EVENT (OUTPATIENT)
Dept: SURGERY | Facility: AMBULATORY SURGERY CENTER | Age: 37
End: 2022-05-18
Payer: COMMERCIAL

## 2022-05-18 LAB — SARS-COV-2 RNA RESP QL NAA+PROBE: NEGATIVE

## 2022-05-19 ENCOUNTER — HOSPITAL ENCOUNTER (OUTPATIENT)
Facility: AMBULATORY SURGERY CENTER | Age: 37
Discharge: HOME | End: 2022-05-19
Attending: OBSTETRICS & GYNECOLOGY
Payer: COMMERCIAL

## 2022-05-19 ENCOUNTER — ANESTHESIA (OUTPATIENT)
Dept: SURGERY | Facility: AMBULATORY SURGERY CENTER | Age: 37
End: 2022-05-19
Payer: COMMERCIAL

## 2022-05-19 VITALS
RESPIRATION RATE: 16 BRPM | HEART RATE: 56 BPM | DIASTOLIC BLOOD PRESSURE: 64 MMHG | TEMPERATURE: 97.4 F | SYSTOLIC BLOOD PRESSURE: 102 MMHG | OXYGEN SATURATION: 97 %

## 2022-05-19 DIAGNOSIS — Z30.2 ENCOUNTER FOR STERILIZATION: ICD-10-CM

## 2022-05-19 DIAGNOSIS — Z98.890 S/P LAPAROSCOPY: Primary | ICD-10-CM

## 2022-05-19 LAB — HCG SERPL QL: NEGATIVE

## 2022-05-19 PROCEDURE — G8918 PT W/O PREOP ORDER IV AB PRO: HCPCS

## 2022-05-19 PROCEDURE — 88302 TISSUE EXAM BY PATHOLOGIST: CPT | Performed by: PATHOLOGY

## 2022-05-19 PROCEDURE — 84703 CHORIONIC GONADOTROPIN ASSAY: CPT | Performed by: ANESTHESIOLOGY

## 2022-05-19 PROCEDURE — 58661 LAPAROSCOPY REMOVE ADNEXA: CPT | Performed by: OBSTETRICS & GYNECOLOGY

## 2022-05-19 PROCEDURE — 58661 LAPAROSCOPY REMOVE ADNEXA: CPT

## 2022-05-19 PROCEDURE — G8907 PT DOC NO EVENTS ON DISCHARG: HCPCS

## 2022-05-19 RX ORDER — ONDANSETRON 4 MG/1
4 TABLET, ORALLY DISINTEGRATING ORAL EVERY 30 MIN PRN
Status: DISCONTINUED | OUTPATIENT
Start: 2022-05-19 | End: 2022-05-20 | Stop reason: HOSPADM

## 2022-05-19 RX ORDER — ONDANSETRON 2 MG/ML
INJECTION INTRAMUSCULAR; INTRAVENOUS PRN
Status: DISCONTINUED | OUTPATIENT
Start: 2022-05-19 | End: 2022-05-19

## 2022-05-19 RX ORDER — LIDOCAINE HYDROCHLORIDE 20 MG/ML
INJECTION, SOLUTION INFILTRATION; PERINEURAL PRN
Status: DISCONTINUED | OUTPATIENT
Start: 2022-05-19 | End: 2022-05-19

## 2022-05-19 RX ORDER — SODIUM CHLORIDE, SODIUM LACTATE, POTASSIUM CHLORIDE, CALCIUM CHLORIDE 600; 310; 30; 20 MG/100ML; MG/100ML; MG/100ML; MG/100ML
INJECTION, SOLUTION INTRAVENOUS CONTINUOUS
Status: DISCONTINUED | OUTPATIENT
Start: 2022-05-19 | End: 2022-05-20 | Stop reason: HOSPADM

## 2022-05-19 RX ORDER — BUPIVACAINE HYDROCHLORIDE AND EPINEPHRINE 2.5; 5 MG/ML; UG/ML
INJECTION, SOLUTION INFILTRATION; PERINEURAL PRN
Status: DISCONTINUED | OUTPATIENT
Start: 2022-05-19 | End: 2022-05-19 | Stop reason: HOSPADM

## 2022-05-19 RX ORDER — HYDROXYZINE HYDROCHLORIDE 25 MG/1
25 TABLET, FILM COATED ORAL
Status: DISCONTINUED | OUTPATIENT
Start: 2022-05-19 | End: 2022-05-20 | Stop reason: HOSPADM

## 2022-05-19 RX ORDER — ACETAMINOPHEN 325 MG/1
975 TABLET ORAL ONCE
Status: DISCONTINUED | OUTPATIENT
Start: 2022-05-19 | End: 2022-05-20 | Stop reason: HOSPADM

## 2022-05-19 RX ORDER — DEXAMETHASONE SODIUM PHOSPHATE 4 MG/ML
INJECTION, SOLUTION INTRA-ARTICULAR; INTRALESIONAL; INTRAMUSCULAR; INTRAVENOUS; SOFT TISSUE PRN
Status: DISCONTINUED | OUTPATIENT
Start: 2022-05-19 | End: 2022-05-19

## 2022-05-19 RX ORDER — FENTANYL CITRATE 50 UG/ML
25 INJECTION, SOLUTION INTRAMUSCULAR; INTRAVENOUS
Status: DISCONTINUED | OUTPATIENT
Start: 2022-05-19 | End: 2022-05-20 | Stop reason: HOSPADM

## 2022-05-19 RX ORDER — OXYCODONE HYDROCHLORIDE 5 MG/1
5 TABLET ORAL EVERY 4 HOURS PRN
Status: DISCONTINUED | OUTPATIENT
Start: 2022-05-19 | End: 2022-05-20 | Stop reason: HOSPADM

## 2022-05-19 RX ORDER — MEPERIDINE HYDROCHLORIDE 25 MG/ML
12.5 INJECTION INTRAMUSCULAR; INTRAVENOUS; SUBCUTANEOUS
Status: DISCONTINUED | OUTPATIENT
Start: 2022-05-19 | End: 2022-05-20 | Stop reason: HOSPADM

## 2022-05-19 RX ORDER — ONDANSETRON 2 MG/ML
4 INJECTION INTRAMUSCULAR; INTRAVENOUS EVERY 30 MIN PRN
Status: DISCONTINUED | OUTPATIENT
Start: 2022-05-19 | End: 2022-05-20 | Stop reason: HOSPADM

## 2022-05-19 RX ORDER — OXYCODONE HYDROCHLORIDE 5 MG/1
5 TABLET ORAL
Status: DISCONTINUED | OUTPATIENT
Start: 2022-05-19 | End: 2022-05-20 | Stop reason: HOSPADM

## 2022-05-19 RX ORDER — IBUPROFEN 800 MG/1
800 TABLET, FILM COATED ORAL EVERY 6 HOURS PRN
Qty: 30 TABLET | Refills: 0 | Status: SHIPPED | OUTPATIENT
Start: 2022-05-19 | End: 2022-06-21

## 2022-05-19 RX ORDER — PROPOFOL 10 MG/ML
INJECTION, EMULSION INTRAVENOUS CONTINUOUS PRN
Status: DISCONTINUED | OUTPATIENT
Start: 2022-05-19 | End: 2022-05-19

## 2022-05-19 RX ORDER — PROPOFOL 10 MG/ML
INJECTION, EMULSION INTRAVENOUS PRN
Status: DISCONTINUED | OUTPATIENT
Start: 2022-05-19 | End: 2022-05-19

## 2022-05-19 RX ORDER — FENTANYL CITRATE 50 UG/ML
INJECTION, SOLUTION INTRAMUSCULAR; INTRAVENOUS PRN
Status: DISCONTINUED | OUTPATIENT
Start: 2022-05-19 | End: 2022-05-19

## 2022-05-19 RX ORDER — OXYCODONE HYDROCHLORIDE 5 MG/1
5-10 TABLET ORAL EVERY 4 HOURS PRN
Qty: 6 TABLET | Refills: 0 | Status: SHIPPED | OUTPATIENT
Start: 2022-05-19 | End: 2022-06-21

## 2022-05-19 RX ORDER — FENTANYL CITRATE 50 UG/ML
25 INJECTION, SOLUTION INTRAMUSCULAR; INTRAVENOUS EVERY 5 MIN PRN
Status: DISCONTINUED | OUTPATIENT
Start: 2022-05-19 | End: 2022-05-20 | Stop reason: HOSPADM

## 2022-05-19 RX ORDER — ACETAMINOPHEN 325 MG/1
975 TABLET ORAL ONCE
Status: COMPLETED | OUTPATIENT
Start: 2022-05-19 | End: 2022-05-19

## 2022-05-19 RX ORDER — KETOROLAC TROMETHAMINE 30 MG/ML
INJECTION, SOLUTION INTRAMUSCULAR; INTRAVENOUS PRN
Status: DISCONTINUED | OUTPATIENT
Start: 2022-05-19 | End: 2022-05-19

## 2022-05-19 RX ORDER — IBUPROFEN 400 MG/1
800 TABLET, FILM COATED ORAL ONCE
Status: DISCONTINUED | OUTPATIENT
Start: 2022-05-19 | End: 2022-05-20 | Stop reason: HOSPADM

## 2022-05-19 RX ADMIN — ONDANSETRON 4 MG: 2 INJECTION INTRAMUSCULAR; INTRAVENOUS at 11:02

## 2022-05-19 RX ADMIN — DEXAMETHASONE SODIUM PHOSPHATE 4 MG: 4 INJECTION, SOLUTION INTRA-ARTICULAR; INTRALESIONAL; INTRAMUSCULAR; INTRAVENOUS; SOFT TISSUE at 10:59

## 2022-05-19 RX ADMIN — SODIUM CHLORIDE, SODIUM LACTATE, POTASSIUM CHLORIDE, CALCIUM CHLORIDE: 600; 310; 30; 20 INJECTION, SOLUTION INTRAVENOUS at 09:15

## 2022-05-19 RX ADMIN — Medication 40 MG: at 10:54

## 2022-05-19 RX ADMIN — ACETAMINOPHEN 975 MG: 325 TABLET ORAL at 09:06

## 2022-05-19 RX ADMIN — KETOROLAC TROMETHAMINE 30 MG: 30 INJECTION, SOLUTION INTRAMUSCULAR; INTRAVENOUS at 11:35

## 2022-05-19 RX ADMIN — LIDOCAINE HYDROCHLORIDE 60 MG: 20 INJECTION, SOLUTION INFILTRATION; PERINEURAL at 10:54

## 2022-05-19 RX ADMIN — PROPOFOL 200 MCG/KG/MIN: 10 INJECTION, EMULSION INTRAVENOUS at 10:54

## 2022-05-19 RX ADMIN — FENTANYL CITRATE 50 MCG: 50 INJECTION, SOLUTION INTRAMUSCULAR; INTRAVENOUS at 10:54

## 2022-05-19 RX ADMIN — FENTANYL CITRATE 25 MCG: 50 INJECTION, SOLUTION INTRAMUSCULAR; INTRAVENOUS at 11:30

## 2022-05-19 RX ADMIN — SODIUM CHLORIDE, SODIUM LACTATE, POTASSIUM CHLORIDE, CALCIUM CHLORIDE: 600; 310; 30; 20 INJECTION, SOLUTION INTRAVENOUS at 11:42

## 2022-05-19 RX ADMIN — PROPOFOL 200 MG: 10 INJECTION, EMULSION INTRAVENOUS at 10:54

## 2022-05-19 NOTE — ANESTHESIA PROCEDURE NOTES
Airway       Patient location during procedure: OR       Procedure Start/Stop Times: 5/19/2022 10:57 AM  Staff -        Performed By: residentIndications and Patient Condition       Indications for airway management: elle-procedural       Induction type:intravenous       Mask difficulty assessment: 1 - vent by mask    Final Airway Details       Final airway type: endotracheal airway       Successful airway: ETT - single  Endotracheal Airway Details        ETT size (mm): 6.5       Cuffed: yes       Successful intubation technique: direct laryngoscopy       DL Blade Type: MAC 3       Grade View of Cords: 1       Adjucts: stylet       Position: Right       Measured from: gums/teeth       Secured at (cm): 22    Post intubation assessment        Placement verified by: capnometry, equal breath sounds and chest rise        Number of attempts at approach: 1       Number of other approaches attempted: 0       Secured with: cloth tape       Ease of procedure: easy       Dentition: Intact    Medication(s) Administered   Medication Administration Time: 5/19/2022 10:57 AM

## 2022-05-19 NOTE — ANESTHESIA POSTPROCEDURE EVALUATION
Patient: Stephen Nye    Procedure: Procedure(s):  SALPINGECTOMY, LAPAROSCOPIC BILATERAL       Anesthesia Type:  General    Note:  Disposition: Outpatient   Postop Pain Control: Uneventful            Sign Out: Well controlled pain   PONV: No   Neuro/Psych: Uneventful            Sign Out: Acceptable/Baseline neuro status   Airway/Respiratory: Uneventful            Sign Out: Acceptable/Baseline resp. status   CV/Hemodynamics: Uneventful            Sign Out: Acceptable CV status   Other NRE: NONE   DID A NON-ROUTINE EVENT OCCUR? No           Last vitals:  Vitals Value Taken Time   /68 05/19/22 1215   Temp 36.3  C (97.4  F) 05/19/22 1151   Pulse 64 05/19/22 1220   Resp 7 05/19/22 1220   SpO2 98 % 05/19/22 1220   Vitals shown include unvalidated device data.    Electronically Signed By: Michael Ruiz MD  May 19, 2022  3:28 PM

## 2022-05-19 NOTE — OP NOTE
HOSPITAL OPERATIVE NOTE  DATE/TIME OF SURGERY: May 19, 2022  PATIENT NAME: Stephen Nye  MRN: [id number]  PATIENT : 1985      Preoperative Diagnosis: Desires permanent sterilization    Postoperative Diagnosis: same    Surgeon: Estefani Martínez DO    Procedure:  Laparoscopic bilateral salpingectomy     Anesthesia: General    EBL:   5 ml    Urine output:    NA    IVF:  1000  ml    Speciman:  Bilateral fallopian tubes    Findings:  Intraoperative findings revealed normal upper anatomy, bilateral fallopian tubes and ovaries. Uterus small, retroverted and with two 1cm pedunculated fibroids at fundus.     Complications:  none    Indication: Stephen Nye is a 36 year old  who presents for bilateral salpingectomy for permanent sterilization. She has been extensively counseled in the office regarding alternative options for contraception, risk of regret in the future. Details of the procedure were discussed with the patient.  Risks include, but are not limited to, bleeding, infection, and injury to surrounding organs such as the bowel, urinary system, nerves, and blood vessels.  Injury may result in repair at the time of the surgery or in a separate procedure.  All questions answered, and accepting these risks, the patient elects to proceed with the procedure.     Procedure: The patient was taken to the operating room. She then underwent GETA, and was positioned to the dorsal lithotomy position with yellow-fin stirrups. She was prepped and draped in usual fashion. A time out was completed.    Attention was then turned to the umbilicus, which was elevated manually.  0.5% marcaine was injected. A 5mm incision was made with scalpel. Entry made directly using the 5 mm Visi-port and insufflation achieved. No entry trauma visualized. The patient was placed in steep Trendelenburg. Two additional 5 mm ports were placed in the LLQ & RLQ under direct visualization in standard fashion. The bowels were swept away using  an atraumatic grasper. The tubes were then sequentially elevated and ligated laterally at the fimbriated ends and medially at the cornua using the Ligasure. Each tube was passed out through a 5 mm port and sent for pathology. The pelvis was then examined with good hemostasis noted. The abdomen was desufflated and camera and ports were then removed. The port site skin incisions were closed in a subcuticular fashion with 4-0 monocryl and dermabond.    There were no complications during the procedure. All instrument and sponge counts were correct x2. The patient was awakened from anesthesia without difficult and went to the postanesthesia recovery room in stable condition.    Estefani Martínez DO

## 2022-05-19 NOTE — ANESTHESIA PREPROCEDURE EVALUATION
"Anesthesia Pre-Procedure Evaluation    Patient: Stephen Nye   MRN: [id number] : 1985        Procedure : Procedure(s):  SALPINGECTOMY, LAPAROSCOPIC BILATERAL          Past Medical History:   Diagnosis Date     Appendicitis      HTN (hypertension)      Hx of abnormal cervical Pap smear 2021    \"Patient states history of abnormal pap and HPV positive in 2006.  Had cryo procedure following that and has had normal pap/hpv since.  She believes last pap was 2019.\" - 3/10/2021 office visit notes  3/10/2021 NIL pap, neg HPV. Plan cotest in 1 year based on +abnormal history per patient report. No pap records available to review.     S/P tonsillectomy and adenoidectomy       Past Surgical History:   Procedure Laterality Date     LASIK  2011      Allergies   Allergen Reactions     Aloe Vera [Aloe]      Banana      Burning and ithching     Biaxin [Clarithromycin]      GI issues     Latex Itching     Nickel      rash     Ortho Micronor [Norethindrone (Contraceptive)]      Ortho tri-cyclen lo     Seasonal Allergies       Social History     Tobacco Use     Smoking status: Never Smoker     Smokeless tobacco: Never Used   Substance Use Topics     Alcohol use: Yes      Wt Readings from Last 1 Encounters:   22 81.6 kg (180 lb)        Anesthesia Evaluation            ROS/MED HX  ENT/Pulmonary:     (+) asthma     Neurologic:       Cardiovascular:     (+) hypertension-----    METS/Exercise Tolerance:     Hematologic:       Musculoskeletal:       GI/Hepatic:     (+) GERD,     Renal/Genitourinary:       Endo:       Psychiatric/Substance Use:       Infectious Disease:       Malignancy:       Other:            Physical Exam    Airway  airway exam normal      Mallampati: II   TM distance: > 3 FB   Neck ROM: full   Mouth opening: > 3 cm    Respiratory Devices and Support         Dental  no notable dental history         Cardiovascular          Rhythm and rate: regular and normal     Pulmonary   pulmonary exam " normal        breath sounds clear to auscultation           OUTSIDE LABS:  CBC:   Lab Results   Component Value Date    WBC 4.1 10/02/2020    HGB 13.5 05/17/2022    HGB 13.7 10/02/2020    HCT 39.3 10/02/2020     10/02/2020     BMP:   Lab Results   Component Value Date     10/02/2020    POTASSIUM 3.7 10/02/2020    CHLORIDE 105 10/02/2020    CO2 26 10/02/2020    BUN 14 10/02/2020    CR 1.00 10/02/2020    GLC 92 03/21/2022    GLC 91 10/02/2020     COAGS: No results found for: PTT, INR, FIBR  POC: No results found for: BGM, HCG, HCGS  HEPATIC:   Lab Results   Component Value Date    ALBUMIN 3.4 10/02/2020    PROTTOTAL 7.8 10/02/2020    ALT 43 10/02/2020    AST 26 10/02/2020    ALKPHOS 77 10/02/2020    BILITOTAL 0.3 10/02/2020     OTHER:   Lab Results   Component Value Date    SERGEY 8.3 (L) 10/02/2020    TSH 3.78 10/02/2020       Anesthesia Plan    ASA Status:  2   NPO Status:  NPO Appropriate    Anesthesia Type: General.     - Airway: ETT   Induction: Intravenous.   Maintenance: Balanced.        Consents    Anesthesia Plan(s) and associated risks, benefits, and realistic alternatives discussed. Questions answered and patient/representative(s) expressed understanding.    - Discussed:     - Discussed with:  Patient      - Extended Intubation/Ventilatory Support Discussed: No.      - Patient is DNR/DNI Status: No    Use of blood products discussed: No .     Postoperative Care    Pain management: IV analgesics, Oral pain medications, Multi-modal analgesia.   PONV prophylaxis: Ondansetron (or other 5HT-3), Dexamethasone or Solumedrol, Background Propofol Infusion, Scopolamine patch     Comments:                Michael Ruiz MD

## 2022-05-19 NOTE — DISCHARGE INSTRUCTIONS
[facility]   Adult Discharge Orders & Instructions     For 24 hours after surgery    Get plenty of rest.  A responsible adult must stay with you for at least 24 hours after you leave the hospital.   Do not drive or use heavy equipment.  If you have weakness or tingling, don't drive or use heavy equipment until this feeling goes away.  Do not drink alcohol.  Avoid strenuous or risky activities.  Ask for help when climbing stairs.   You may feel lightheaded.  IF so, sit for a few minutes before standing.  Have someone help you get up.   If you have nausea (feel sick to your stomach): Drink only clear liquids such as apple juice, ginger ale, broth or 7-Up.  Rest may also help.  Be sure to drink enough fluids.  Move to a regular diet as you feel able.  You may have a slight fever. Call the doctor if your fever is over 100 F (37.7 C) (taken under the tongue) or lasts longer than 24 hours.  You may have a dry mouth, a sore throat, muscle aches or trouble sleeping.  These should go away after 24 hours.  Do not make important or legal decisions.     Call your doctor for any of the followin.  Signs of infection (fever, growing tenderness at the surgery site, a large amount of drainage or bleeding, severe pain, foul-smelling drainage, redness, swelling).    2. It has been over 8 to 10 hours since surgery and you are still not able to urinate (pass water).    3.  Headache for over 24 hours.    4.  Numbness, tingling or weakness the day after surgery (if you had spinal anesthesia).                  5. Signs of Covid-19 infection (temperature over 100 degrees, shortness of breath, cough, loss of taste/smell, generalized body aches, persistent headache,                  chills, sore throat, nausea/vomiting/diarrhea).    To contact a doctor, call [phone]    ________________________________________    You had 975 mg of Tylenol at 9 AM. You may repeat this after 3 PM.   Maximum amount of  Tylenol/Acetaminophen in a 24 hour period is 4,000 mg.

## 2022-05-19 NOTE — INTERVAL H&P NOTE
"I have reviewed the surgical (or preoperative) H&P that is linked to this encounter, and examined the patient. There are no significant changes    Clinical Conditions Present on Arrival:  Clinically Significant Risk Factors Present on Admission                   # Overweight: Estimated body mass index is 29.05 kg/m  as calculated from the following:    Height as of 5/17/22: 1.676 m (5' 6\").    Weight as of 5/17/22: 81.6 kg (180 lb).       "

## 2022-05-24 LAB
PATH REPORT.COMMENTS IMP SPEC: NORMAL
PATH REPORT.COMMENTS IMP SPEC: NORMAL
PATH REPORT.FINAL DX SPEC: NORMAL
PATH REPORT.GROSS SPEC: NORMAL
PATH REPORT.MICROSCOPIC SPEC OTHER STN: NORMAL
PATH REPORT.RELEVANT HX SPEC: NORMAL
PHOTO IMAGE: NORMAL

## 2022-06-14 ENCOUNTER — VIRTUAL VISIT (OUTPATIENT)
Dept: ONCOLOGY | Facility: CLINIC | Age: 37
End: 2022-06-14
Attending: NURSE PRACTITIONER
Payer: COMMERCIAL

## 2022-06-14 DIAGNOSIS — Z80.8 FAMILY HISTORY OF MELANOMA: ICD-10-CM

## 2022-06-14 DIAGNOSIS — Z80.3 FAMILY HISTORY OF MALIGNANT NEOPLASM OF BREAST: Primary | ICD-10-CM

## 2022-06-14 DIAGNOSIS — Z80.9 FAMILY HISTORY OF CANCER: ICD-10-CM

## 2022-06-14 DIAGNOSIS — Z80.42 FAMILY HISTORY OF PROSTATE CANCER: ICD-10-CM

## 2022-06-14 PROCEDURE — 96040 HC GENETIC COUNSELING, EACH 30 MINUTES: CPT | Mod: GT,95 | Performed by: GENETIC COUNSELOR, MS

## 2022-06-14 NOTE — LETTER
6/14/2022         RE: Stephen Nye  [address] [facility] [address]        Dear Colleague,    Thank you for referring your patient, Stephen Nye, to the [facility]. Please see a copy of my visit note below.      Cancer Risk Management Program Genetic Counseling Note    6/14/2022    Referring Provider:  Magdalena Gongora CNP    Presenting Information:   I had a video visiit with Stephen Nye today for genetic counseling through the Cancer Risk Management Program, in order to discuss her family history of breast cancer, melanoma, and prostate cancer.  She presents today to review this history, cancer screening recommendations, and available genetic testing options.    Personal History:  Stephen is a 37 year old female. She does not have any personal history of cancer.  In other medical history, Stephen has diagnosis of asthma, which she takes medication for regularly.  She also reports taking medication for gastroesophageal (GE) reflux and also having issues with chronic constipation.  She reports a long history of gynecological concerns, which included issues with a variety of different types of contraceptives (IUD, NuvaRing, oral contraceptives, and Depo Provera) and uterine fibroids (for which she had a hysteroscopy); she had a bilateral salpingectomy last month related to some of those previous gynecological issues and a desire for permanent contraception.       With respect to her cancer screening history, it is reported that Stephen had an abnormal Pap smear when she was 21, for which she underwent cryotherapy.  She also reports a past history of HPV.  She reports having a mole previously removed from her forehead and that pathology studies weren't concerning for cancer; she states that she gets skin checks approximately every 2 years.  She has not yet needed a mammogram or colonoscopy.  She does reports having a previous upper GI endoscopy because of her history of GE reflux,  which she reports was negative.    With respect to her hormone-based medical history, Stephen reports that she had her first menstrual period at age 15 and that she has not yet started any symptoms of menopause.  As noted above, Stephen has used a variety of hormone-based contraceptives.  Her ovaries and uterus remain intact.    Stephen reports no personal history of smoking; she does report a history of some exposure to second-hand smoke as a younger person.  She reports no personal history of chronic, excessive alcohol use (outside of a few college experiences).  Stephen states that she is not aware of any other personal history of any specific, potentially concerning environmental exposures with respect to cancer risk.  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    Family History: (Please see scanned pedigree for detailed family history information)    Stephen reports that her mother had a history of a melanoma diagnosed on her chest in her early 50s.  She states that this was treated with surgical removal only.      Stephen states that she has a maternal aunt that had some sort of cancer removed from her shin, but the details of that exact diagnosis have not been shared with Stephen.    Stephen reports that her maternal grandmother was diagnosed with breast cancer at the age of 42, for which she underwent a mastectomy.    Stephen reports that her maternal grandfather also had a history of a melanoma; she reports that his melanoma was on his neck and only required surgical intervention.    On the other side of the family, Stephen reports that her paternal grandfather  of metastatic prostate cancer in his early 40s.    Stephen also reports that she has one paternal cousin (a paternal aunt's son) that was diagnosed with lymph or throat cancer in his 40s; she states that this cousin's father (who is not related to Stephen) had a history of the same type of cancer.    Stephen  reports that her mother's family is of Monegasque ancestry and that her father's family is of Pashto and Sophie ancestry.  There is no known Ashkenazi Quaker ancestry on either side of her family. There is no reported consanguinity.    Discussion:    We reviewed the features of sporadic, familial, and hereditary cancers. In looking at Stephen's family history, it is possible that a cancer susceptibility gene is present due to her maternal grandmother's history of early onset breast cancer.  We also talked about that melanoma can be linked to breast cancer in some hereditary cancer syndromes, and so it is possible that some of this cancer history of breast cancer and melanoma could be related on her mother's side of the family.  On the other side of the family, we talked about that metastatic prostate cancer also has an increased chance of being related to a hereditary cancer syndrome (as compared to other types of prostate cancer); in addition, that prostate cancer was of an unusually early onset.        We discussed the natural history and genetics of hereditary cancer syndromes. A detailed handout regarding some of the hereditary cancer syndromes associated with breast caner and the information we discussed was provided to Stephen after our appointment and can be found in the after visit summary. Topics included: inheritance pattern, cancer risks, cancer screening recommendations, and also risks, benefits and limitations of testing.      Based on her family history, Stephen meets current National Comprehensive Cancer Network (NCCN) criteria for genetic testing of high-penetrance breast cancer susceptibility genes (including BRCA1, BRCA2, CDH1, PALB2, PTEN, and TP53), because of her maternal grandmother's history of breast cancer at the age of 42.  (She also meets NCCN criteria for genetic testing of BRCA1/BRCA2 because of her paternal grandfather's history of metastatic prostate cancer.)        We discussed that there are  additional genes besides those listed above that could cause increased risk for breast, melanoma, prostate, or other cancer. As many of these genes present with overlapping features in a family and accurate cancer risk cannot always be established based upon the pedigree analysis alone, it would be reasonable for Stephen to consider panel genetic testing to analyze multiple genes at once.      Stephen stated that she was interested in pursuing genetic testing through a panel of genes associated with hereditary cancer syndromes, and so we reviewed the various panel options and their potential benefits and limitations.  After this conversation, Stephen decided that she was interested in a BRCA1/BRCA2 analysis with an automatic reflex to the Common Hereditary Cancers (including the melanoma genes) genetic testing panel.      The Common Hereditary Cancers genetic testing panel through [facility] includes analysis for 47 genes associated with cancers of the breast, ovary, uterus, prostate and gastrointestinal system: APC, VIDAL, AXIN2, BARD1, BMPR1A, BRCA1, BRCA2, BRIP1, CDH1, CDK4, CDKN2A, CHEK2, CTNNA1, DICER1, EPCAM, GREM1, HOXB13, KIT, MEN1, MLH1, MSH2, MSH3, MSH6, MUTYH, NBN, NF1, NTHL1, PALB2, PDGFRA, PMS2, POLD1, POLE, PTEN,RAD50, RAD51C, RAD51D, SDHA, SDHB, SDHC, SDHD, SMAD4, SMARCA4, STK11, TP53,TSC1, TSC2, and VHL.        We discussed that many of these genes are associated with specific hereditary cancer syndromes and published management guidelines: Hereditary Breast and Ovarian Cancer syndrome (BRCA1, BRCA2), Galo syndrome (MLH1, MSH2, MSH6, PMS2, EPCAM), Familial Adenomatous Polyposis (APC), Hereditary Diffuse Gastric Cancer (CDH1), Familial Atypical Multiple Mole Melanoma syndrome (CDK4, CDKN2A), Multiple Endocrine Neoplasia type 1 (MEN1), Juvenile Polyposis syndrome (BMPR1A, SMAD4), Cowden syndrome (PTEN), Li Fraumeni syndrome (TP53), Hereditary Paraganglioma and Pheochromocytoma syndrome (SDHA, SDHB, SDHC, SDHD),  Peutz-Jeghers syndrome (STK11), MUTYH Associated Polyposis (MUTYH), Tuberous sclerosis complex (TSC1, TSC2), Von Hippel-Lindau disease (VHL), and Neurofibromatosis type 1 (NF1). The VIDAL, AXIN2, BRIP1, CHEK2, GREM1, MSH3, NBN, NTHL1, PALB2, POLD1, POLE, RAD51C, and RAD51D genes are associated with increased cancer risk and have published management guidelines for certain cancers. The remaining genes (BARD1, CTNNA1, DICER1, HOXB13, KIT, PDGFRA, RAD50, and SMARCA4) are associated with increased cancer risk and may allow us to make medical recommendations when mutations are identified.       Completion of the melanoma genetic testing panel adds on the following: BAP1, MITF, POT1, RB1, MC1R, and TERT.      Medical Management: For Stephen, we reviewed that the information from genetic testing may determine:    additional cancer screening for which Stephen may qualify (eg. mammogram and breast MRI, more frequent colonoscopies, more frequent dermatologic exams, etc.),    options for risk-reducing surgeries Stephen could consider (eg. bilateral mastectomy, surgery to remove her ovaries and/or uterus, etc.),      and targeted therapies for Stephen, if she were to get certain cancers in the future (eg. lumpectomy versus mastectomy, immunotherapy for individuals with Galo syndrome, PARP inhibitors for HBOC syndrome, etc.).       These recommendations and possible targeted therapies will be discussed in detail once genetic testing is completed.     Plan:  1) Today, Stephen decided to proceed with a BRCA1/BRCA2 analysis with an automatic reflex to the Common Hereditary Cancers (including melanoma) genetic testing panel.  Therefore, consent was reviewed and verbally obtained for this testing.    2) We reviewed the options for sample collection.  Stephen plans to have her blood drawn at her local [facility]. Test results are expected to be available within 4-6 weeks of that blood draw.  3) Stephen will either have a telephone visit  or video visit to discuss the results.  Additional recommendations about screening will be made at that time.      Patrizia Denton MS, [facility]  Genetic Counselor  Ph: [phone]    Face to face time over video: 48 minutes

## 2022-06-14 NOTE — PROGRESS NOTES
Stephen is a 37 year old who is being evaluated via a billable video visit.      Pt is in [place]    How would you like to obtain your AVS? MyChart  If the video visit is dropped, the invitation should be resent by: Send to e-mail at: [email or web address]  Will anyone else be joining your video visit? No      Video Start Time: 11:14AM    Video-Visit Details    Type of service:  Video Visit    Video End Time:12:02PM    Originating Location (pt. Location): Home    Distant Location (provider location):  [facility] CANCER [facility]     Platform used for Video Visit: Neema Denton MS, [facility]  Genetic Counselor

## 2022-06-14 NOTE — LETTER
Cancer Risk Management  Program Locations    [facility]  [facility]  [facility]  [facility]  Mailing Address  Cancer Risk Management Program  [facility]  [address]  [facility] [address]  [place] [address]    New patient appointments  [phone]  June 21, 2022    Stephen Nye  [address] [facility] [address]      Dear Stephen,    It was a pleasure talking with you via your virtual genetic counseling visit on 6-.  Below is a copy of the progress note from that recent visit through the Cancer Risk Management Program.  If you have any additional questions, please feel free to contact me.    Cancer Risk Management Program Genetic Counseling Note    6/14/2022    Referring Provider:  Magdalena Gongora CNP    Presenting Information:   I had a video visiit with Stephen Nye today for genetic counseling through the Cancer Risk Management Program, in order to discuss her family history of breast cancer, melanoma, and prostate cancer.  She presents today to review this history, cancer screening recommendations, and available genetic testing options.    Personal History:  Stephen is a 37 year old female. She does not have any personal history of cancer.  In other medical history, Stephen has diagnosis of asthma, which she takes medication for regularly.  She also reports taking medication for gastroesophageal (GE) reflux and also having issues with chronic constipation.  She reports a long history of gynecological concerns, which included issues with a variety of different types of contraceptives (IUD, NuvaRing, oral contraceptives, and Depo Provera) and uterine fibroids (for which she had a hysteroscopy); she had a bilateral salpingectomy last month related to some of those previous gynecological issues and a desire for permanent contraception.       With respect to  her cancer screening history, it is reported that Stephen had an abnormal Pap smear when she was 21, for which she underwent cryotherapy.  She also reports a past history of HPV.  She reports having a mole previously removed from her forehead and that pathology studies weren't concerning for cancer; she states that she gets skin checks approximately every 2 years.  She has not yet needed a mammogram or colonoscopy.  She does reports having a previous upper GI endoscopy because of her history of GE reflux, which she reports was negative.    With respect to her hormone-based medical history, Stephen reports that she had her first menstrual period at age 15 and that she has not yet started any symptoms of menopause.  As noted above, Stephen has used a variety of hormone-based contraceptives.  Her ovaries and uterus remain intact.    Stephen reports no personal history of smoking; she does report a history of some exposure to second-hand smoke as a younger person.  She reports no personal history of chronic, excessive alcohol use (outside of a few college experiences).  Stephen states that she is not aware of any other personal history of any specific, potentially concerning environmental exposures with respect to cancer risk.  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    Family History: (Please see scanned pedigree for detailed family history information)    Stephen reports that her mother had a history of a melanoma diagnosed on her chest in her early 50s.  She states that this was treated with surgical removal only.      Stephen states that she has a maternal aunt that had some sort of cancer removed from her shin, but the details of that exact diagnosis have not been shared with Stephen.    Stephen reports that her maternal grandmother was diagnosed with breast cancer at the age of 42, for which she underwent a mastectomy.    Stephen reports that her maternal grandfather  also had a history of a melanoma; she reports that his melanoma was on his neck and only required surgical intervention.    On the other side of the family, Stephen reports that her paternal grandfather  of metastatic prostate cancer in his early 40s.    Stephen also reports that she has one paternal cousin (a paternal aunt's son) that was diagnosed with lymph or throat cancer in his 40s; she states that this cousin's father (who is not related to Stephen) had a history of the same type of cancer.    Stephen reports that her mother's family is of Cymraes ancestry and that her father's family is of [place] and Citizen of [place] ancestry.  There is no known Ashkenazi Congregation ancestry on either side of her family. There is no reported consanguinity.    Discussion:    We reviewed the features of sporadic, familial, and hereditary cancers. In looking at Setphen's family history, it is possible that a cancer susceptibility gene is present due to her maternal grandmother's history of early onset breast cancer.  We also talked about that melanoma can be linked to breast cancer in some hereditary cancer syndromes, and so it is possible that some of this cancer history of breast cancer and melanoma could be related on her mother's side of the family.  On the other side of the family, we talked about that metastatic prostate cancer also has an increased chance of being related to a hereditary cancer syndrome (as compared to other types of prostate cancer); in addition, that prostate cancer was of an unusually early onset.        We discussed the natural history and genetics of hereditary cancer syndromes. A detailed handout regarding some of the hereditary cancer syndromes associated with breast caner and the information we discussed was provided to Stephen after our appointment and can be found in the after visit summary. Topics included: inheritance pattern, cancer risks, cancer screening recommendations, and also risks, benefits and limitations  of testing.      Based on her family history, Stephen meets current National Comprehensive Cancer Network (NCCN) criteria for genetic testing of high-penetrance breast cancer susceptibility genes (including BRCA1, BRCA2, CDH1, PALB2, PTEN, and TP53), because of her maternal grandmother's history of breast cancer at the age of 42.  (She also meets NCCN criteria for genetic testing of BRCA1/BRCA2 because of her paternal grandfather's history of metastatic prostate cancer.)        We discussed that there are additional genes besides those listed above that could cause increased risk for breast, melanoma, prostate, or other cancer. As many of these genes present with overlapping features in a family and accurate cancer risk cannot always be established based upon the pedigree analysis alone, it would be reasonable for Stephen to consider panel genetic testing to analyze multiple genes at once.      Stephen stated that she was interested in pursuing genetic testing through a panel of genes associated with hereditary cancer syndromes, and so we reviewed the various panel options and their potential benefits and limitations.  After this conversation, Stephen decided that she was interested in a BRCA1/BRCA2 analysis with an automatic reflex to the Common Hereditary Cancers (including the melanoma genes) genetic testing panel.      The Common Hereditary Cancers genetic testing panel through [facility] includes analysis for 47 genes associated with cancers of the breast, ovary, uterus, prostate and gastrointestinal system: APC, VIDAL, AXIN2, BARD1, BMPR1A, BRCA1, BRCA2, BRIP1, CDH1, CDK4, CDKN2A, CHEK2, CTNNA1, DICER1, EPCAM, GREM1, HOXB13, KIT, MEN1, MLH1, MSH2, MSH3, MSH6, MUTYH, NBN, NF1, NTHL1, PALB2, PDGFRA, PMS2, POLD1, POLE, PTEN,RAD50, RAD51C, RAD51D, SDHA, SDHB, SDHC, SDHD, SMAD4, SMARCA4, STK11, TP53,TSC1, TSC2, and VHL.        We discussed that many of these genes are associated with specific hereditary cancer syndromes and  published management guidelines: Hereditary Breast and Ovarian Cancer syndrome (BRCA1, BRCA2), Galo syndrome (MLH1, MSH2, MSH6, PMS2, EPCAM), Familial Adenomatous Polyposis (APC), Hereditary Diffuse Gastric Cancer (CDH1), Familial Atypical Multiple Mole Melanoma syndrome (CDK4, CDKN2A), Multiple Endocrine Neoplasia type 1 (MEN1), Juvenile Polyposis syndrome (BMPR1A, SMAD4), Cowden syndrome (PTEN), Li Fraumeni syndrome (TP53), Hereditary Paraganglioma and Pheochromocytoma syndrome (SDHA, SDHB, SDHC, SDHD), Peutz-Jeghers syndrome (STK11), MUTYH Associated Polyposis (MUTYH), Tuberous sclerosis complex (TSC1, TSC2), Von Hippel-Lindau disease (VHL), and Neurofibromatosis type 1 (NF1). The VIDAL, AXIN2, BRIP1, CHEK2, GREM1, MSH3, NBN, NTHL1, PALB2, POLD1, POLE, RAD51C, and RAD51D genes are associated with increased cancer risk and have published management guidelines for certain cancers. The remaining genes (BARD1, CTNNA1, DICER1, HOXB13, KIT, PDGFRA, RAD50, and SMARCA4) are associated with increased cancer risk and may allow us to make medical recommendations when mutations are identified.       Completion of the melanoma genetic testing panel adds on the following: BAP1, MITF, POT1, RB1, MC1R, and TERT.      Medical Management: For Stephen, we reviewed that the information from genetic testing may determine:    additional cancer screening for which Stephen may qualify (eg. mammogram and breast MRI, more frequent colonoscopies, more frequent dermatologic exams, etc.),    options for risk-reducing surgeries Stephen could consider (eg. bilateral mastectomy, surgery to remove her ovaries and/or uterus, etc.),      and targeted therapies for Stephen, if she were to get certain cancers in the future (eg. lumpectomy versus mastectomy, immunotherapy for individuals with Galo syndrome, PARP inhibitors for HBOC syndrome, etc.).       These recommendations and possible targeted therapies will be discussed in detail once genetic testing is  completed.     Plan:  1) Today, Stephen decided to proceed with a BRCA1/BRCA2 analysis with an automatic reflex to the Common Hereditary Cancers (including melanoma) genetic testing panel.  Therefore, consent was reviewed and verbally obtained for this testing.    2) We reviewed the options for sample collection.  Stephen plans to have her blood drawn at her local [facility]. Test results are expected to be available within 4-6 weeks of that blood draw.  3) Stephen will either have a telephone visit or video visit to discuss the results.  Additional recommendations about screening will be made at that time.    Patrizia Denton MS, [facility]  Genetic Counselor  Ph: [phone]      Face to face time over video: 48 minutes

## 2022-06-15 NOTE — PATIENT INSTRUCTIONS
Assessing Cancer Risk  Only about 5-10% of cancers are thought to be due to an inherited cancer susceptibility gene.    These families often have:  Several people with the same or related types of cancer  Cancers diagnosed at a young age (before age 50)  Individuals with more than one primary cancer  Multiple generations of the family affected with cancer    Some people may be candidates for genetic testing of more than one gene.  For these families, genetic testing using a cancer panel may be offered.  These panels will test different genes known to increase the risk for breast, ovarian, uterine, and/or other cancers. All of the genes discussed below have published clinical management guidelines for individuals who are found to carry a mutation. The purpose of this handout is to serve as a brief summary of the genes analyzed by the panels used to inquire about hereditary breast and gynecologic cancer:  VIDAL, BRCA1, BRCA2, BRIP1, CDH1, CHEK2, MLH1, MSH2, MSH6, PMS2, EPCAM, PTEN, PALB2, RAD51C, RAD51D, and TP53.  ______________________________________________________________________________  Hereditary Breast and Ovarian Cancer Syndrome   (BRCA1 and BRCA2)  A single mutation in one of the copies of BRCA1 or BRCA2 increases the risk for breast and ovarian cancer, among others.  The risk for pancreatic cancer and melanoma may also be slightly increased in some families.  The chart below shows the chance that someone with a BRCA mutation would develop cancer in his or her lifetime1,2,3,4.        A person s ethnic background is also important to consider, as individuals of Ashkenazi Christianity ancestry have a higher chance of having a BRCA gene mutation.  There are three BRCA mutations that occur more frequently in this population.    Galo Syndrome   (MLH1, MSH2, MSH6, PMS2, and EPCAM)  Currently five genes are known to cause Galo Syndrome: MLH1, MSH2, MSH6, PMS2, and EPCAM.  A single mutation in one of the Galo  Syndrome genes increases the risk for colon, endometrial, ovarian, and stomach cancers.  Other cancers that occur less commonly in Galo Syndrome include urinary tract, skin, and brain cancers.  The chart below shows the chance that a person with Galo syndrome would develop cancer in his or her lifetime5.      *Cancer risk varies depending on Galo syndrome gene found    Cowden Syndrome   (PTEN)  Cowden syndrome is a hereditary condition that increases the risk for breast, thyroid, endometrial, colon, and kidney cancer.  Cowden syndrome is caused by a mutation in the PTEN gene.  A single mutation in one of the copies of PTEN causes Cowden syndrome and increases cancer risk.  The chart below shows the chance that someone with a PTEN mutation would develop cancer in their lifetime6,7.  Other benign features seen in some individuals with Cowden syndrome include benign skin lesions (facial papules, keratoses, lipomas), learning disability, autism, thyroid nodules, colon polyps, and larger head size.      *One recent study found breast cancer risk to be increased to 85%    Li-Fraumeni Syndrome   (TP53)  Li-Fraumeni Syndrome (LFS) is a cancer predisposition syndrome caused by a mutation in the TP53 gene. A single mutation in one of the copies of TP53 increases the risk for multiple cancers. Individuals with LFS are at an increased risk for developing cancer at a young age. The lifetime risk for development of a LFS-associated cancer is 50% by age 30 and 90% by age 60.   Core Cancers: Sarcomas, Breast, Brain, Lung, Leukemias/Lymphomas, Adrenocortical carcinomas  Other Cancers: Gastrointestinal, Thyroid, Skin, Genitourinary    Hereditary Diffuse Gastric Cancer   (CDH1)  Currently, one gene is known to cause hereditary diffuse gastric cancer (HDGC): CDH1.  Individuals with HDGC are at increased risk for diffuse gastric cancer and lobular breast cancer. Of people diagnosed with HDGC, 30-50% have a mutation in the CDH1 gene.   This suggests there are likely other genes that may cause HDGC that have not been identified yet.      Lifetime Cancer Risks    General Population HDGC    Diffuse Gastric  <1% ~80%   Breast 12% 39-52%         Additional Genes  VIDAL  VIDAL is a moderate-risk breast cancer gene. Women who have a mutation in VIDAL can have between a 2-4 fold increased risk for breast cancer compared to the general population8. VIDAL mutations have also been associated with increased risk for pancreatic cancer, however an estimate of this cancer risk is not well understood9. Individuals who inherit two VIDAL mutations have a condition called ataxia-telangiectasia (AT).  This rare autosomal recessive condition affects the nervous system and immune system, and is associated with progressive cerebellar ataxia beginning in childhood.  Individuals with ataxia-telangiectasia often have a weakened immune system and have an increased risk for childhood cancers.    PALB2  Mutations in PALB2 have been shown to increase the risk of breast cancer up to 33-58% in some families; where individuals fall within this risk range is dependent upon family [id number]. PALB2 mutations have also been associated with increased risk for pancreatic cancer, although this risk has not been quantified yet.  Individuals who inherit two PALB2 mutations--one from their mother and one from their father--have a condition called Fanconi Anemia.  This rare autosomal recessive condition is associated with short stature, developmental delay, bone marrow failure, and increased risk for childhood cancers.    CHEK2   CHEK2 is a moderate-risk breast cancer gene.  Women who have a mutation in CHEK2 have around a 2-fold increased risk for breast cancer compared to the general population, and this risk may be higher depending upon family history.11,12,13 Mutations in CHEK2 have also been shown to increase the risk of a number of other cancers, including colon and prostate, however these  cancer risks are currently not well understood.    BRIP1, RAD51C and RAD51D  Mutations in BRIP1, RAD51C, and RAD51D have been shown to increase the risk of ovarian cancer and possibly female breast cancer as well14,15 .       Lifetime Cancer Risk    General Population BRIP1 RAD51C RAD51D   Ovarian 1-2% ~5-8% ~5-9% ~7-15%           Inheritance  All of the cancer syndromes reviewed above are inherited in an autosomal dominant pattern.  This means that if a parent has a mutation, each of his or her children will have a 50% chance of inheriting that same mutation.  Therefore, each child--male or female--would have a 50% chance of being at increased risk for developing cancer.      Image obtained from Genetics Home Reference, 2013     Mutations in some genes can occur de merna, which means that a person s mutation occurred for the first time in them and was not inherited from a parent.  Now that they have the mutation, however, it can be passed on to future generations.    Genetic Testing  Genetic testing involves a blood test and will look at the genetic information in the VIDAL, BRCA1, BRCA2, BRIP1, CDH1, CHEK2, MLH1, MSH2, MSH6, PMS2, EPCAM, PTEN, PALB2, RAD51C, RAD51D, and TP53 genes for any harmful mutations that are associated with increased cancer risk.  If possible, it is recommended that the person(s) who has had cancer be tested before other family members.  That person will give us the most useful information about whether or not a specific gene is associated with the cancer in the family.    Results  There are three possible results of genetic testing:  Positive--a harmful mutation was identified in one or more of the genes  Negative--no mutation was identified in any of the genes on this panel  Variant of unknown significance--a variation in one of the genes was identified, but it is unclear how this impacts cancer risk in the family    Advantages and Disadvantages   There are advantages and disadvantages to  genetic testing.    Advantages  May clarify your cancer risk  Can help you make medical decisions  May explain the cancers in your family  May give useful information to your family members (if you share your results)    Disadvantages  Possible negative emotional impact of learning about inherited cancer risk  Uncertainty in interpreting a negative test result in some situations  Possible genetic discrimination concerns (see below)    Genetic Information Nondiscrimination Act (ABHAY)  ABHAY is a federal law that protects individuals from health insurance or employment discrimination based on a genetic test result alone.  Although rare, there are currently no legal discrimination protections in terms of life insurance, long term care, or disability insurances.  Visit the [employer] website to learn more.    Reducing Cancer Risk  All of the genes described above have nationally recognized cancer screening guidelines that would be recommended for individuals who test positive.  In addition to increased cancer screening, surgeries may be offered or recommended to reduce cancer risk.  Recommendations are based upon an individual s genetic test result as well as their personal and family history of cancer.    Questions to Think About Regarding Genetic Testing:  What effect will the test result have on me and my relationship with my family members if I have an inherited gene mutation?  If I don t have a gene mutation?  Should I share my test results, and how will my family react to this news, which may also affect them?  Are my children ready to learn new information that may one day affect their own health?    Hereditary Cancer Resources    FORCE: Facing Our Risk of Cancer Empowered facingourrisk.org   Bright Pink bebrightpink.org   Li-Fraumeni Syndrome Association lfsassociation.org   PTEN World PTENworld.[email or web address]   No stomach for cancer, Inc. nostomachforcancer.org   Stomach cancer relief network  Scrnet.org   Collaborative Group of the Americas on Inherited Colorectal Cancer (CGA) cgaicc.com    Cancer Care cancercare.org   American Cancer Society (ACS) cancer.org   National Cancer Dike (NCI) cancer.gov     Please call us if you have any questions or concerns.   Cancer Risk Management Program [phone]-CANCER ([phone])  Santy Keating, MS [facility] [phone]  Jane Shin, MS, [facility] [phone]  Patrizia Denton, MS, [facility]  [phone]  Melyssa Calhoun, MS, [facility]  [phone]  Ileana Santo, MS, [facility]  [phone]  Ct Penn, MS, [facility] [phone]  Ava Canales, MS, [facility] [phone]    References  Pernell Ayala PDP, Lakeshia S, Oniel GOLDEN, Judi JE, Delroy JL, Keya N, Olinda H, Meg O, Ena A, Randi B, Clif P, Manclement S, Shai DM, Masood N, Viviana E, Rose Mary H, Melo E, Lubinski J, Gronwald J, Santino B, Devon H, Thorlacius S, Eerola H, Ajit H, Danii K, Corie OP. Average risks of breast and ovarian cancer associated with BRCA1 or BRCA2 mutations detected in case series unselected for family history: a combined analysis of 222 studies. Am J Hum Brandy. 2003;72:1117-30.  Sebastian N, Lori M, Minnie G.  BRCA1 and BRCA2 Hereditary Breast and Ovarian Cancer. Gene Reviews online. 2013.  Kerwin YC, Crystal S, Susy G, Elizondo S. Breast cancer risk among male BRCA1 and BRCA2 mutation carriers. J Natl Cancer Inst. 2007;99:1811-4.  Amari MARIE, Jamil I, Mino J, Campbell E, Rony ER, Leanne F. Risk of breast cancer in male BRCA2 carriers. J Med Brandy. 2010;47:710-1.  National Comprehensive Cancer Network. Clinical practice guidelines in oncology, colorectal cancer screening. Available online (registration required). 2015.  Patricio LAKE, Paige J, Madan J, Rm LA, Ryan MS, Eng C. Lifetime cancer risks in individuals with germline PTEN mutations. Clin Cancer Res. 2012;18:400-7.  Randall JIMENEZ. Cowden Syndrome: A Critical Review of the Clinical Literature. J Brandy .  2009:18:13-27.  Deirdre A, Julius D, Carol S, Cally P, Tony T, Shayy M, Prakash B, Elena H, Zane R, Aminta K, Jayne L, Amari DG, Shai D,  DF, Gilda MR, The Breast Cancer Susceptibility Collaboration () & Pro ALANIS. VIDAL mutations that cause ataxia-telangiectasia are breast cancer susceptibility alleles. Nature Genetics. 2006;38:873-875  Gustavo N , Bhanu Y, Teri J, Javier L, Chun GM , Charissa ML, Gallinger S, Elder AG, Syngal S, Zoey ML, Reema J , Aris R, Guy SZ, Jhon JR, Lior VE, Lauren M, Vorosalia B, Coy N, Nathalie RH, Amarilis KW, and Elaine AP. VIDAL mutations in patients with hereditary pancreatic cancer. Cancer Discover. 2012;2:41-46  Rob BAILEY., et al. Breast-Cancer Risk in Families with Mutations in PALB2. NEJM. 2014; 371(6):497-506.  CHEK2 Breast Cancer Case-Control Consortium. CHEK2*1100delC and susceptibility to breast cancer: A collaborative analysis involving 10,860 breast cancer cases and 9,065 controls from 10 studies. Am J Hum Brandy, 74 (2004), pp. 4043-1735  Isa T, Karl S, Lucina K, et al. Spectrum of Mutations in BRCA1, BRCA2, CHEK2, and TP53 in Families at High Risk of Breast Cancer. MERY. 2006;295(12):8156-9397.   Gino C, Felix D, Phoenix A, et al. Risk of breast cancer in women with a CHEK2 mutation with and without a family history of breast cancer. J Clin Oncol. 2011;29:2330-7380.  Cayetano H, Latosha E, Heri SJ, et al. Contribution of germline mutations in the RAD51B, RAD51C, and RAD51D genes to ovarian cancer in the population. J Clin Oncol. 2015;33(26):8262-5776. Doi:10.1200/JCO.2015.61.2408.  Patrick Prietoon DF, Kate P, et al. Mutations in BRIP1 confer high risk of ovarian cancer. Chayito Brandy. 2011;43(11):0065-4294. doi:10.1038/ng.955.

## 2022-06-15 NOTE — PROGRESS NOTES
Cancer Risk Management Program Genetic Counseling Note    6/14/2022    Referring Provider:  Magdalena Gongora CNP    Presenting Information:   I had a video visiit with Stephen Nye today for genetic counseling through the Cancer Risk Management Program, in order to discuss her family history of breast cancer, melanoma, and prostate cancer.  She presents today to review this history, cancer screening recommendations, and available genetic testing options.    Personal History:  Stephen is a 37 year old female. She does not have any personal history of cancer.  In other medical history, Stephen has diagnosis of asthma, which she takes medication for regularly.  She also reports taking medication for gastroesophageal (GE) reflux and also having issues with chronic constipation.  She reports a long history of gynecological concerns, which included issues with a variety of different types of contraceptives (IUD, NuvaRing, oral contraceptives, and Depo Provera) and uterine fibroids (for which she had a hysteroscopy); she had a bilateral salpingectomy last month related to some of those previous gynecological issues and a desire for permanent contraception.       With respect to her cancer screening history, it is reported that Stephen had an abnormal Pap smear when she was 21, for which she underwent cryotherapy.  She also reports a past history of HPV.  She reports having a mole previously removed from her forehead and that pathology studies weren't concerning for cancer; she states that she gets skin checks approximately every 2 years.  She has not yet needed a mammogram or colonoscopy.  She does reports having a previous upper GI endoscopy because of her history of GE reflux, which she reports was negative.    With respect to her hormone-based medical history, Stephen reports that she had her first menstrual period at age 15 and that she has not yet started any symptoms of menopause.  As noted above, Stephen has used a variety of  hormone-based contraceptives.  Her ovaries and uterus remain intact.    Stephen reports no personal history of smoking; she does report a history of some exposure to second-hand smoke as a younger person.  She reports no personal history of chronic, excessive alcohol use (outside of a few college experiences).  Stephen states that she is not aware of any other personal history of any specific, potentially concerning environmental exposures with respect to cancer risk.  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    Family History: (Please see scanned pedigree for detailed family history information)    Stephen reports that her mother had a history of a melanoma diagnosed on her chest in her early 50s.  She states that this was treated with surgical removal only.      Stephen states that she has a maternal aunt that had some sort of cancer removed from her shin, but the details of that exact diagnosis have not been shared with Stephen.    Stephen reports that her maternal grandmother was diagnosed with breast cancer at the age of 42, for which she underwent a mastectomy.    Stephen reports that her maternal grandfather also had a history of a melanoma; she reports that his melanoma was on his neck and only required surgical intervention.    On the other side of the family, Stephen reports that her paternal grandfather  of metastatic prostate cancer in his early 40s.    Stephen also reports that she has one paternal cousin (a paternal aunt's son) that was diagnosed with lymph or throat cancer in his 40s; she states that this cousin's father (who is not related to Stephen) had a history of the same type of cancer.    Stephen reports that her mother's family is of [place]ian ancestry and that her father's family is of English and Persian ancestry.  There is no known Ashkenazi Mandaeism ancestry on either side of her family. There is no reported consanguinity.    Discussion:    We  reviewed the features of sporadic, familial, and hereditary cancers. In looking at Stephen's family history, it is possible that a cancer susceptibility gene is present due to her maternal grandmother's history of early onset breast cancer.  We also talked about that melanoma can be linked to breast cancer in some hereditary cancer syndromes, and so it is possible that some of this cancer history of breast cancer and melanoma could be related on her mother's side of the family.  On the other side of the family, we talked about that metastatic prostate cancer also has an increased chance of being related to a hereditary cancer syndrome (as compared to other types of prostate cancer); in addition, that prostate cancer was of an unusually early onset.        We discussed the natural history and genetics of hereditary cancer syndromes. A detailed handout regarding some of the hereditary cancer syndromes associated with breast caner and the information we discussed was provided to Stephen after our appointment and can be found in the after visit summary. Topics included: inheritance pattern, cancer risks, cancer screening recommendations, and also risks, benefits and limitations of testing.      Based on her family history, Stephen meets current National Comprehensive Cancer Network (NCCN) criteria for genetic testing of high-penetrance breast cancer susceptibility genes (including BRCA1, BRCA2, CDH1, PALB2, PTEN, and TP53), because of her maternal grandmother's history of breast cancer at the age of 42.  (She also meets NCCN criteria for genetic testing of BRCA1/BRCA2 because of her paternal grandfather's history of metastatic prostate cancer.)        We discussed that there are additional genes besides those listed above that could cause increased risk for breast, melanoma, prostate, or other cancer. As many of these genes present with overlapping features in a family and accurate cancer risk cannot always be established  based upon the pedigree analysis alone, it would be reasonable for Stephen to consider panel genetic testing to analyze multiple genes at once.      Stephen stated that she was interested in pursuing genetic testing through a panel of genes associated with hereditary cancer syndromes, and so we reviewed the various panel options and their potential benefits and limitations.  After this conversation, Stephen decided that she was interested in a BRCA1/BRCA2 analysis with an automatic reflex to the Common Hereditary Cancers (including the melanoma genes) genetic testing panel.      The Common Hereditary Cancers genetic testing panel through [facility] includes analysis for 47 genes associated with cancers of the breast, ovary, uterus, prostate and gastrointestinal system: APC, VIDAL, AXIN2, BARD1, BMPR1A, BRCA1, BRCA2, BRIP1, CDH1, CDK4, CDKN2A, CHEK2, CTNNA1, DICER1, EPCAM, GREM1, HOXB13, KIT, MEN1, MLH1, MSH2, MSH3, MSH6, MUTYH, NBN, NF1, NTHL1, PALB2, PDGFRA, PMS2, POLD1, POLE, PTEN,RAD50, RAD51C, RAD51D, SDHA, SDHB, SDHC, SDHD, SMAD4, SMARCA4, STK11, TP53,TSC1, TSC2, and VHL.        We discussed that many of these genes are associated with specific hereditary cancer syndromes and published management guidelines: Hereditary Breast and Ovarian Cancer syndrome (BRCA1, BRCA2), Galo syndrome (MLH1, MSH2, MSH6, PMS2, EPCAM), Familial Adenomatous Polyposis (APC), Hereditary Diffuse Gastric Cancer (CDH1), Familial Atypical Multiple Mole Melanoma syndrome (CDK4, CDKN2A), Multiple Endocrine Neoplasia type 1 (MEN1), Juvenile Polyposis syndrome (BMPR1A, SMAD4), Cowden syndrome (PTEN), Li Fraumeni syndrome (TP53), Hereditary Paraganglioma and Pheochromocytoma syndrome (SDHA, SDHB, SDHC, SDHD), Peutz-Jeghers syndrome (STK11), MUTYH Associated Polyposis (MUTYH), Tuberous sclerosis complex (TSC1, TSC2), Von Hippel-Lindau disease (VHL), and Neurofibromatosis type 1 (NF1). The VIDAL, AXIN2, BRIP1, CHEK2, GREM1, MSH3, NBN, NTHL1, PALB2, POLD1,  POLE, RAD51C, and RAD51D genes are associated with increased cancer risk and have published management guidelines for certain cancers. The remaining genes (BARD1, CTNNA1, DICER1, HOXB13, KIT, PDGFRA, RAD50, and SMARCA4) are associated with increased cancer risk and may allow us to make medical recommendations when mutations are identified.       Completion of the melanoma genetic testing panel adds on the following: BAP1, MITF, POT1, RB1, MC1R, and TERT.      Medical Management: For Stephen, we reviewed that the information from genetic testing may determine:    additional cancer screening for which Stephen may qualify (eg. mammogram and breast MRI, more frequent colonoscopies, more frequent dermatologic exams, etc.),    options for risk-reducing surgeries Stephen could consider (eg. bilateral mastectomy, surgery to remove her ovaries and/or uterus, etc.),      and targeted therapies for Stephen, if she were to get certain cancers in the future (eg. lumpectomy versus mastectomy, immunotherapy for individuals with Galo syndrome, PARP inhibitors for HBOC syndrome, etc.).       These recommendations and possible targeted therapies will be discussed in detail once genetic testing is completed.     Plan:  1) Today, Stephen decided to proceed with a BRCA1/BRCA2 analysis with an automatic reflex to the Common Hereditary Cancers (including melanoma) genetic testing panel.  Therefore, consent was reviewed and verbally obtained for this testing.    2) We reviewed the options for sample collection.  Stephen plans to have her blood drawn at her local [facility]. Test results are expected to be available within 4-6 weeks of that blood draw.  3) Stephen will either have a telephone visit or video visit to discuss the results.  Additional recommendations about screening will be made at that time.    Patrizia Denton MS, [facility]  Genetic Counselor  Ph: [phone]      Face to face time over video: 48 minutes

## 2022-06-21 ENCOUNTER — OFFICE VISIT (OUTPATIENT)
Dept: OBGYN | Facility: CLINIC | Age: 37
End: 2022-06-21
Payer: COMMERCIAL

## 2022-06-21 VITALS
SYSTOLIC BLOOD PRESSURE: 100 MMHG | HEART RATE: 86 BPM | DIASTOLIC BLOOD PRESSURE: 70 MMHG | WEIGHT: 187.4 LBS | BODY MASS INDEX: 30.25 KG/M2

## 2022-06-21 DIAGNOSIS — L70.9 ACNE, UNSPECIFIED ACNE TYPE: ICD-10-CM

## 2022-06-21 DIAGNOSIS — Z98.890 POSTOPERATIVE STATE: Primary | ICD-10-CM

## 2022-06-21 PROCEDURE — 99212 OFFICE O/P EST SF 10 MIN: CPT | Performed by: OBSTETRICS & GYNECOLOGY

## 2022-06-21 NOTE — PATIENT INSTRUCTIONS
If you have any questions regarding your visit, Please contact your care team.    [facility]: [phone]      Women s Health CLINIC HOURS TELEPHONE NUMBER   Estefani Martínez DO.    LEANN Dorado -Surgery Scheduler  [place] - Surgery Scheduler    ONEYDA Mccord, RN  ONEYDA White     Monday, Thursday  [place]  7am-3pm    Tuesday, Wednesday  [place]  7am-3pm    [place] Friday &   [place]    Typical Surgery Days: Thursday or Friday   [facility]  [address] [place] [address] [phone] Phone  [phone] Fax    [address] [address] [phone] Phone    Imaging [phone] Phone    [facility] Labor and Delivery:  [phone] Phone     **Surgeries** Our Surgery Schedulers will contact you to schedule. If you do not receive a call within 3 business days, please call [phone].    Urgent Care locations:  [facility] Saturday and    9 am - 5 pm    Monday-Friday   12 pm - 8 pm  Saturday and    9 am - 5 pm   [phone]       If you need a medication refill, please contact your pharmacy. Please allow 3 business days for your refill to be completed.  As always, Thank you for trusting us with your healthcare needs!

## 2022-06-21 NOTE — PROGRESS NOTES
SUBJECTIVE:       HPI: Stephen Nye is a 37 year old  is s/p Laparoscopic bilateral salpingectomy on 22. Since surgery, she has been doing very well.     Bothersome acne, not sure how she wants to manage    Ob Hx:      Gyn Hx: Patient's last menstrual period was 2022.     Last pap was 3/21/22 nil neg hpv         Today's PHQ-2 Score:   PHQ-2 (  [employer]) 2022   Q1: Little interest or pleasure in doing things 0   Q2: Feeling down, depressed or hopeless 0   PHQ-2 Score 0   PHQ-2 Total Score (12-17 Years)- Positive if 3 or more points; Administer PHQ-A if positive -   Q1: Little interest or pleasure in doing things -   Q2: Feeling down, depressed or hopeless -   PHQ-2 Score -     Today's PHQ-9 Score: No flowsheet data found.  Today's TAYLOR-7 Score: No flowsheet data found.    Problem list and histories reviewed & adjusted, as indicated.  Additional history: as documented.    Patient Active Problem List   Diagnosis     Chronic right shoulder pain     Mild intermittent asthma without complication     Gastroesophageal reflux disease without esophagitis     Hx of abnormal cervical Pap smear     Encounter for sterilization     Past Surgical History:   Procedure Laterality Date     LAPAROSCOPIC SALPINGECTOMY Bilateral 2022    Procedure: SALPINGECTOMY, LAPAROSCOPIC BILATERAL;  Surgeon: Estefani Martínez DO;  Location: [facility]  2011      Social History     Tobacco Use     Smoking status: Never Smoker     Smokeless tobacco: Never Used   Substance Use Topics     Alcohol use: Yes      Problem (# of Occurrences) Relation (Name,Age of Onset)    Asthma (1) Mother    Attention Deficit Disorder (1) Brother    Deep Vein Thrombosis (DVT) (1) Father    Depression (1) Brother    Hyperlipidemia (1) Mother    Hypertension (1) Mother    Migraines (1) Brother    Pulmonary Embolism (1) Father    Transient ischemic attack (1) Mother       Negative family history of: Glaucoma, Macular Degeneration             albuterol (PROVENTIL) (2.5 MG/3ML) 0.083% neb solution, Take 2.5 mg by nebulization every 6 hours as needed for shortness of breath / dyspnea or wheezing  BLACK CURRANT SEED OIL PO,   BREO ELLIPTA 100-25 MCG/INH inhaler, Inhale 1 puff into the lungs daily  Calcium Carbonate-Vit D-Min (CALCIUM 1200 PO), Take 2 capfuls by mouth daily  diphenhydrAMINE (BENADRYL) 25 MG tablet, Take 25 mg by mouth daily  FIBER SELECT GUMMIES PO,   fluticasone (FLONASE) 50 MCG/ACT nasal spray, Spray 1 spray in nostril 2 times daily  folic acid (FOLVITE) 400 MCG tablet, Take 400 mcg by mouth daily  Lactobacillus Acid-Pectin (LACTOBACILLUS ACIDOPHILUS) TABS, Take 1 tablet by mouth 3 times daily (before meals)  loratadine (CLARITIN) 10 MG tablet, Take 10 mg by mouth daily  omeprazole (PRILOSEC) 40 MG DR capsule, Take 1 capsule (40 mg) by mouth daily  Prenatal Vit-Fe Fumarate-FA (PRENATAL MULTIVITAMIN  PLUS IRON) 27-1 MG TABS, Take by mouth daily  sodium chloride (PEREZ 128) 5 % ophthalmic ointment, Apply 1/4 inch to right eye at bedtime for 2-3 months  VENTOLIN  (90 Base) MCG/ACT inhaler, 2 puffs prn  [DISCONTINUED] etonogestrel-ethinyl estradiol (NUVARING) 0.12-0.015 MG/24HR vaginal ring, Leave ring in place for 3 weeks, then remove for 1 week.    No current facility-administered medications on file prior to visit.    Allergies   Allergen Reactions     Aloe Vera [Aloe]      Banana      Burning and ithching     Biaxin [Clarithromycin]      GI issues     Latex Itching     Nickel      rash     Ortho Micronor [Norethindrone (Contraceptive)]      Ortho tri-cyclen lo     Seasonal Allergies        ROS:  10 Point review of systems negative other noted above in HPI    OBJECTIVE:     /70   Pulse 86   Wt 85 kg (187 lb 6.4 oz)   LMP 06/09/2022   BMI 30.25 kg/m    Body mass index is 30.25 kg/m .      Gen: Alert, oriented, appropriately interactive, NAD  Chest: Symmetrical, unlabored breathing  Abdomen: soft, non tender, non  distended, incisions c/d/i  Lower extremities: non-tender, no edema      In-Clinic Test Results:  No results found for this or any previous visit (from the past 24 hour(s)).     Final Diagnosis   A. Left fallopian tube, left salpingectomy:  - One completely transected Fallopian tube segment, no significant histologic abnormalities     B. Right fallopian tube, right salpingectomy:  - One completely transected Fallopian tube segment, no significant histologic abnormalities       ASSESSMENT/PLAN:                                                      Stephen Nye is a 37 year old  s/p Laparoscopic bilateral salpingectomy on 22       ICD-10-CM    1. Postoperative state  Z98.890    2. Acne, unspecified acne type  L70.9        Doing well postoperatively.     Cleared for routine to normal activity, including intercourse.     Bothersome acne since surgery, considering seeing dermatology vs hormonal therapy. Father recently diagnosed with PE (hx DVT). Being worked up right now. Recommend considering seeing derm prior to hormonal therapy. Will call if desires referral.    RTO as needed or for routine well exam in 12 months        Estefani Martínez DO  [facility]

## 2022-09-01 ENCOUNTER — LAB (OUTPATIENT)
Dept: LAB | Facility: CLINIC | Age: 37
End: 2022-09-01
Payer: COMMERCIAL

## 2022-09-01 DIAGNOSIS — Z80.42 FAMILY HISTORY OF PROSTATE CANCER: ICD-10-CM

## 2022-09-01 DIAGNOSIS — Z80.8 FAMILY HISTORY OF MELANOMA: ICD-10-CM

## 2022-09-01 DIAGNOSIS — Z80.9 FAMILY HISTORY OF CANCER: ICD-10-CM

## 2022-09-01 DIAGNOSIS — Z80.3 FAMILY HISTORY OF MALIGNANT NEOPLASM OF BREAST: ICD-10-CM

## 2022-09-01 PROCEDURE — 99000 SPECIMEN HANDLING OFFICE-LAB: CPT

## 2022-09-01 PROCEDURE — 36415 COLL VENOUS BLD VENIPUNCTURE: CPT

## 2022-09-15 LAB — SCANNED LAB RESULT: NORMAL

## 2022-09-24 ENCOUNTER — HEALTH MAINTENANCE LETTER (OUTPATIENT)
Age: 37
End: 2022-09-24

## 2022-10-04 ENCOUNTER — VIRTUAL VISIT (OUTPATIENT)
Dept: ONCOLOGY | Facility: CLINIC | Age: 37
End: 2022-10-04
Attending: GENETIC COUNSELOR, MS
Payer: COMMERCIAL

## 2022-10-04 DIAGNOSIS — Z80.9 FAMILY HISTORY OF CANCER: ICD-10-CM

## 2022-10-04 DIAGNOSIS — Z80.3 FAMILY HISTORY OF MALIGNANT NEOPLASM OF BREAST: Primary | ICD-10-CM

## 2022-10-04 DIAGNOSIS — Z80.8 FAMILY HISTORY OF MELANOMA: ICD-10-CM

## 2022-10-04 DIAGNOSIS — Z80.42 FAMILY HISTORY OF PROSTATE CANCER: ICD-10-CM

## 2022-10-04 PROCEDURE — 999N000069 HC STATISTIC GENETIC COUNSELING, < 16 MIN: Mod: GT,95 | Performed by: GENETIC COUNSELOR, MS

## 2022-10-04 NOTE — LETTER
Cancer Risk Management  Program Locations    [facility]  [facility]  [facility]  [facility]  Mailing Address  Cancer Risk Management Program  [facility]  [address]  [place] [address]    New patient appointments  [phone]  October 21, 2022    Stephen Nye  [address] [address]      Dear Stephen,    It was a pleasure talking with you via your virtual genetic counseling visit on 10-4-2022.  Below is a copy of the progress note from that recent visit through the Cancer Risk Management Program.  If you have any additional questions, please feel free to contact me. I hope your move went smoothly!    Cancer Risk Management Program Genetic Counseling Note    10/4/2022    Referring Provider:  Magdalena Gongora CNP    Presenting Information:  Stephen Nye had a return video visit through the Cancer Risk Management Program, in order to discuss her genetic testing results. Her blood was drawn on 9-1-2022.  A BRCA1/BRCA2 genetic analysis with an automatic reflex to the Common Hereditary Cancers and the Melanoma genetic testing panel was ordered from [facility]. This testing was done because of her family history of breast, melanoma, and other cancer.    Genetic Testing Result: NEGATIVE  Stephen is negative for mutations in the following genes : APC, VIDAL, AXIN2, BAP1, BARD1, BMPR1A, BRCA1, BRCA2, BRIP1, CDH1, CDK4, CDKN2A (p14ARF), CDKN2A (f84DXC5b), CHEK2, CTNNA1, DICER1, EPCAM, GREM1, HOXB13, KIT, MC1R, MEN1, MITF, MLH1, MSH2, MSH3, MSH6, MUTYH, NBN, NF1, NTHL1, PALB2,   PDGFRA, PMS2, POLD1, POLE, POT1, PTEN, RAD50, RAD51C, RAD51D, RB1, SDHA, SDHB, SDHC, SDHD, SMAD4, SMARCA4, STK11, TERT, TP53, TSC1, TSC2, and  VHL      Therefore, genetic testing did not detect an identifiable mutation associated with Hereditary Breast and Ovarian Cancer  "syndrome (BRCA1, BRCA2), Galo syndrome (MLH1, MSH2, MSH6, PMS2, EPCAM), Familial Adenomatous Polyposis (APC), Hereditary Diffuse Gastric Cancer (CDH1), Familial Atypical Multiple Mole Melanoma syndrome (CDK4, CDKN2A), Juvenile Polyposis syndrome (BMPR1A, SMAD4), Cowden syndrome (PTEN), Li Fraumeni syndrome (TP53), Peutz-Jeghers syndrome (STK11), MUTYH Associated Polyposis (MUTYH), Hereditary Paraganglioma and Pheochromocytoma syndrome (SDHA, SDHB, SDHC, SDHD), Tuberous sclerosis complex (TSC1, TSC2), Von Hippel-Lindau disease (VHL), Neurofibromatosis type 1 (NF1), and Multiple Endocrine Neoplasia type 1 (MEN1).    A copy of the test report can be found in the Laboratory tab, dated 9-1-2022, and named \"LABORATORY MISCELLANEOUS ORDER.\" The report is scanned in as a linked document.    Interpretation:  We discussed several different interpretations of this negative test result:   1. One explanation may be that there is a different gene or combination of genes and environment that are associated with the cancers in this family.  2. Another explanation may be that some her relatives did have a mutation in one of these hereditary cancer syndromes, but she did not inherit it.  3. There is also a small possibility that there is a mutation in one of these genes, and the testing laboratory could not find it with their current testing methods.       Screening:  Based on this negative test result, it is important for Stephen and her relatives to refer back to the family history for appropriate cancer screening.      We talked about today that since we do not have a genetic explanation for the breast cancer in Stephen's family, this negative genetic test result does not give us specific breast cancer risk information for Stephen personally.  In this circumstance, we talked about that a few different breast cancer risk models have been used to try to estimate a woman's breast cancer risk, in order to determine which women should " "consider increased breast cancer surveillance:      - We talked about that the Alphonso model is based on family history of breast cancer in first and second degree relatives only; we talked about that this model would allow me to estimate breast cancer risk based on her maternal grandmother's history history of breast cancer.  Stephen reports that her maternal grandmother was diagnosed with breast cancer at age 42; using this information, the Alphonso tables gives a 10.4% lifetime risk of breast cancer.        - We talked about that another model (the MONTEZ breast cancer risk calculator) gives breast cancer risk estimates based on family history, plus some additional risk factors such as breast density, previous breast biopsies, age of menarche/first child, and previous genetic testing results; the MONTEZ risk calculator predicted around a 17% lifetime risk for breast cancer for Stephen.      We talked about that when a women's estimated lifetime risk of breast cancer is 20% or higher based on at least one of these models, it is generally recommended that they have additional conversations about the options for increased breast cancer surveillance.  We talked about that since Stephen's estimated breast cancer risk is not 20% or higher based on the above models, \"increased risk\" breast screening protocols not indicated for Stephen. (Stephen's maternal aunts should talk with their health care team about their family history of breast cancer, as they may qualify for increased breast screening based on family history and their own personal health history.)        However, we talked about that it is generally recommended that individuals with a family history of early onset breast cancer start their breast cancer screening imaging about 10 years before the earliest age of breast cancer in the family.  Since Stephen's maternal grandmother was diagnosed with breast cancer at age 42, it is appropriate for Stephen to begin her yearly mammogram " screening at this time.       We also discussed that given that Stephen's mother had a personal history of melanoma, it would be reasonable for Stephen and her brother (and her mother's siblings) to have annual skin exams through a dermatologist or primary care provider.  (More frequent screening may be indicated based on a particular individual's skin findings.)      Other population cancer screening options, such as those recommended by the American Cancer Society and the National Comprehensive Cancer Network (NCCN), are also appropriate for Stephen and her family.       These screening recommendations may change if there are changes to Stephen's personal and/or family history of cancer. Final screening recommendations should be made by each individual's primary care provider    Additional Testing Considerations:  It is possible Stephen does carry a gene or combination of genes and environment that increase her risk for cancer that was not identifiable through this particular genetic testing panel. Stephen is encouraged to contact me in the future if she wants to know if there is additional genetic testing that might be available/indicated for her then or if she wishes to readdress larger gene panel options in the future.     Although Stephen's genetic testing result was negative, other relatives may still carry a gene mutation associated with breast (or other) cancer. Genetic counseling is recommended for Stephen's maternal aunts, in order to discuss genetic testing options. If any of these relatives do pursue genetic testing, Stephen is encouraged to contact me so that we may review the impact of their test results on her.    Summary:  We do not have an explanation for Stephen's family history of cancer. While no genetic changes were identified, Stephen may still be at risk for certain cancers due to family history, environmental factors, or other genetic causes not identified by this test.  Because of that, it is important that she  continue with cancer screening based on her personal and family history as discussed above.    Genetic testing is rapidly advancing, and new cancer susceptibility genes will most likely be identified in the future. Therefore, I encouraged Stephen to contact me annually or if there are changes in her personal or family history. This may change how we assess her cancer risk, screening, and the testing we would offer.    Plan:  1.  I provided Stephen with a copy of her test results today through the [employer] portal.  She will also get a copy of this clinic note and a handout summarizing negative genetic test results (see after visit summary).  2. Stephen reports that she is in the process of getting ready to [place] to another [place], and she plans to talk with her new primary care provider about mammogram screening, once she establishes care there.  3. Stephen should contact me periodically, or if her personal or family history of cancer changes, and she would like to know if there are additional screening or testing recommendations at that time.    If Stephen has any further questions, I encouraged her to contact me via [employer] or through email: [email or web address].    Patrizia Denton MS, [facility]  Genetic Counselor  Cancer Risk Management Program    Time spent over video: 14 minutes

## 2022-10-04 NOTE — LETTER
10/4/2022         RE: Stephen Nye  [address] [facility] [address]        Dear Colleague,    Thank you for referring your patient, Stephen Nye, to the [facility]. Please see a copy of my visit note below.    Cancer Risk Management Program Genetic Counseling Note    10/4/2022    Referring Provider:  Magdalena Gongora CNP    Presenting Information:  Stephen Nye had a return video visit through the Cancer Risk Management Program, in order to discuss her genetic testing results. Her blood was drawn on 9-1-2022.  A BRCA1/BRCA2 genetic analysis with an automatic reflex to the Common Hereditary Cancers and the Melanoma genetic testing panel was ordered from [facility]. This testing was done because of her family history of breast, melanoma, and other cancer.    Genetic Testing Result: NEGATIVE  Stephen is negative for mutations in the following genes : APC, VIDAL, AXIN2, BAP1, BARD1, BMPR1A, BRCA1, BRCA2, BRIP1, CDH1, CDK4, CDKN2A (p14ARF), CDKN2A ([id number]), CHEK2, CTNNA1, DICER1, EPCAM, GREM1, HOXB13, KIT, MC1R, MEN1, MITF, MLH1, MSH2, MSH3, MSH6, MUTYH, NBN, NF1, NTHL1, PALB2,   PDGFRA, PMS2, POLD1, POLE, POT1, PTEN, RAD50, RAD51C, RAD51D, RB1, SDHA, SDHB, SDHC, SDHD, SMAD4, SMARCA4, STK11, TERT, TP53, TSC1, TSC2, and  VHL      Therefore, genetic testing did not detect an identifiable mutation associated with Hereditary Breast and Ovarian Cancer syndrome (BRCA1, BRCA2), Galo syndrome (MLH1, MSH2, MSH6, PMS2, EPCAM), Familial Adenomatous Polyposis (APC), Hereditary Diffuse Gastric Cancer (CDH1), Familial Atypical Multiple Mole Melanoma syndrome (CDK4, CDKN2A), Juvenile Polyposis syndrome (BMPR1A, SMAD4), Cowden syndrome (PTEN), Li Fraumeni syndrome (TP53), Peutz-Jeghers syndrome (STK11), MUTYH Associated Polyposis (MUTYH), Hereditary Paraganglioma and Pheochromocytoma syndrome (SDHA, SDHB, SDHC, SDHD), Tuberous sclerosis complex (TSC1, TSC2), Von Hippel-Lindau disease (VHL),  "Neurofibromatosis type 1 (NF1), and Multiple Endocrine Neoplasia type 1 (MEN1).    A copy of the test report can be found in the Laboratory tab, dated 9-1-2022, and named \"LABORATORY MISCELLANEOUS ORDER.\" The report is scanned in as a linked document.    Interpretation:  We discussed several different interpretations of this negative test result:   1. One explanation may be that there is a different gene or combination of genes and environment that are associated with the cancers in this family.  2. Another explanation may be that some her relatives did have a mutation in one of these hereditary cancer syndromes, but she did not inherit it.  3. There is also a small possibility that there is a mutation in one of these genes, and the testing laboratory could not find it with their current testing methods.       Screening:  Based on this negative test result, it is important for Stephen and her relatives to refer back to the family history for appropriate cancer screening.      We talked about today that since we do not have a genetic explanation for the breast cancer in Stephen's family, this negative genetic test result does not give us specific breast cancer risk information for Stephen personally.  In this circumstance, we talked about that a few different breast cancer risk models have been used to try to estimate a woman's breast cancer risk, in order to determine which women should consider increased breast cancer surveillance:      - We talked about that the Alphonso model is based on family history of breast cancer in first and second degree relatives only; we talked about that this model would allow me to estimate breast cancer risk based on her maternal grandmother's history history of breast cancer.  Stephen reports that her maternal grandmother was diagnosed with breast cancer at age 42; using this information, the Alphonso tables gives a 10.4% lifetime risk of breast cancer.        - We talked about that another model " "(the MONTEZ breast cancer risk calculator) gives breast cancer risk estimates based on family history, plus some additional risk factors such as breast density, previous breast biopsies, age of menarche/first child, and previous genetic testing results; the MONTEZ risk calculator predicted around a 17% lifetime risk for breast cancer for Stephen.      We talked about that when a women's estimated lifetime risk of breast cancer is 20% or higher based on at least one of these models, it is generally recommended that they have additional conversations about the options for increased breast cancer surveillance.  We talked about that since Stephen's estimated breast cancer risk is not 20% or higher based on the above models, \"increased risk\" breast screening protocols not indicated for Stephen. (Stephen's maternal aunts should talk with their health care team about their family history of breast cancer, as they may qualify for increased breast screening based on family history and their own personal health history.)        However, we talked about that it is generally recommended that individuals with a family history of early onset breast cancer start their breast cancer screening imaging about 10 years before the earliest age of breast cancer in the family.  Since Stephen's maternal grandmother was diagnosed with breast cancer at age 42, it is appropriate for Stephen to begin her yearly mammogram screening at this time.       We also discussed that given that Stephen's mother had a personal history of melanoma, it would be reasonable for Stephen and her brother (and her mother's siblings) to have annual skin exams through a dermatologist or primary care provider.  (More frequent screening may be indicated based on a particular individual's skin findings.)      Other population cancer screening options, such as those recommended by the American Cancer Society and the National Comprehensive Cancer Network (NCCN), are also appropriate for " Stephen and her family.       These screening recommendations may change if there are changes to Stephen's personal and/or family history of cancer. Final screening recommendations should be made by each individual's primary care provider    Additional Testing Considerations:  It is possible Stephen does carry a gene or combination of genes and environment that increase her risk for cancer that was not identifiable through this particular genetic testing panel. Stephen is encouraged to contact me in the future if she wants to know if there is additional genetic testing that might be available/indicated for her then or if she wishes to readdress larger gene panel options in the future.     Although Stephen's genetic testing result was negative, other relatives may still carry a gene mutation associated with breast (or other) cancer. Genetic counseling is recommended for Stephen's maternal aunts, in order to discuss genetic testing options. If any of these relatives do pursue genetic testing, Stephen is encouraged to contact me so that we may review the impact of their test results on her.    Summary:  We do not have an explanation for Stephen's family history of cancer. While no genetic changes were identified, Stephen may still be at risk for certain cancers due to family history, environmental factors, or other genetic causes not identified by this test.  Because of that, it is important that she continue with cancer screening based on her personal and family history as discussed above.    Genetic testing is rapidly advancing, and new cancer susceptibility genes will most likely be identified in the future. Therefore, I encouraged Stephen to contact me annually or if there are changes in her personal or family history. This may change how we assess her cancer risk, screening, and the testing we would offer.    Plan:  1.  I provided Stephen with a copy of her test results today through the [employer] portal.  She will also get a copy of  this clinic note and a handout summarizing negative genetic test results (see after visit summary).  2. Stephen reports that she is in the process of getting ready to moving to another state, and she plans to talk with her new primary care provider about mammogram screening, once she establishes care there.  3. Stephen should contact me periodically, or if her personal or family history of cancer changes, and she would like to know if there are additional screening or testing recommendations at that time.    If Stephen has any further questions, I encouraged her to contact me via [employer] or through email: [email or web address].    Patrizia Denton MS, [facility]  Genetic Counselor  Cancer Risk Management Program    Time spent over video: 14 minutes

## 2022-10-04 NOTE — PROGRESS NOTES
Stephen is a 37 year old who is being evaluated via a billable video visit.      How would you like to obtain your AVS? MyChart  If the video visit is dropped, the invitation should be resent by: Text to cell phone: [phone]  Will anyone else be joining your video visit? Merline ROBERTSON    Video-Visit Details    Video Start Time: 10:16AM    Type of service:  Video Visit    Video End Time:  10:30AM    Originating Location (pt. Location): Home    Distant Location (provider location):  [facility]     Platform used for Video Visit: Neema Denton MS, [facility]  Genetic Counselor  Cancer Risk Management Program

## 2022-10-04 NOTE — PATIENT INSTRUCTIONS
Negative Genetic Test Result    Genetic Testing  You had a blood test that looked at the genetic information in one or more genes associated with increased cancer risk.  The testing looked for any harmful changes that would stop this particular gene from working like it should. If an individual does not have any harmful changes or variants of unknown significance found from their blood test, their genetic test result is reported as negative.       Results  The genetic test did not identify any pathogenic (harmful) changes in the genes that were tested. There are several possible explanations for a negative test result. Without knowing the gene mutation in your family, the cause of the cancer in your relatives is still unknown. Your genetic counselor can help interpret the result for you and your relatives. In this case, there are several reasons that may explain the negative test result:  There may be a gene mutation in the family that you did not inherit.   You may have a gene mutation in a different gene that was not included in the test, or has not yet been discovered.   The cancers in your family may be due to a combination of genetic factors and environment (multifactorial/familial).  The cancers in your family may be sporadic/random cancers.  There is very small chance that a mutation was not found by current testing methods.  As testing technology evolves over time, it may still be possible to identify a mutation in a gene that was not found on this test.    It is important to note which genes were included in your test. A list of these genes can be found on your test result.    Screening Recommendations  Due to this negative test result, cancer screening recommendations should be based on your personal and family history. This may include increased cancer screening for you and/or your family members. Your genetic counselor and health care provider can help make appropriate recommendations.       Please call us if you have any questions or concerns.   Cancer Risk Management Program [phone]-CANCER ([phone])  Santy Keating, MS [facility] [place]  [phone]  Jane Melissa, MS, [facility] [place] [phone]  LEONIDAS Denton, MS, [facility] [place]  [phone]    [email or web address]  Melyssa Calhoun, MS, [facility] [place]  [phone]  Ileana Blanchard, MS, [facility] [place]  [phone]  Ct Penn, MS, [facility] [place] [phone]  Ava Canales, MS, [facility] [place] [phone]

## 2022-10-04 NOTE — PROGRESS NOTES
"Cancer Risk Management Program Genetic Counseling Note    10/4/2022    Referring Provider:  Magdalena Gongora CNP    Presenting Information:  Stephen Nye had a return video visit through the Cancer Risk Management Program, in order to discuss her genetic testing results. Her blood was drawn on 9-1-2022.  A BRCA1/BRCA2 genetic analysis with an automatic reflex to the Common Hereditary Cancers and the Melanoma genetic testing panel was ordered from [facility]. This testing was done because of her family history of breast, melanoma, and other cancer.    Genetic Testing Result: NEGATIVE  Stephen is negative for mutations in the following genes : APC, VIDAL, AXIN2, BAP1, BARD1, BMPR1A, BRCA1, BRCA2, BRIP1, CDH1, CDK4, CDKN2A (p14ARF), CDKN2A ([id number]), CHEK2, CTNNA1, DICER1, EPCAM, GREM1, HOXB13, KIT, MC1R, MEN1, MITF, MLH1, MSH2, MSH3, MSH6, MUTYH, NBN, NF1, NTHL1, PALB2,   PDGFRA, PMS2, POLD1, POLE, POT1, PTEN, RAD50, RAD51C, RAD51D, RB1, SDHA, SDHB, SDHC, SDHD, SMAD4, SMARCA4, STK11, TERT, TP53, TSC1, TSC2, and  VHL      Therefore, genetic testing did not detect an identifiable mutation associated with Hereditary Breast and Ovarian Cancer syndrome (BRCA1, BRCA2), Galo syndrome (MLH1, MSH2, MSH6, PMS2, EPCAM), Familial Adenomatous Polyposis (APC), Hereditary Diffuse Gastric Cancer (CDH1), Familial Atypical Multiple Mole Melanoma syndrome (CDK4, CDKN2A), Juvenile Polyposis syndrome (BMPR1A, SMAD4), Cowden syndrome (PTEN), Li Fraumeni syndrome (TP53), Peutz-Jeghers syndrome (STK11), MUTYH Associated Polyposis (MUTYH), Hereditary Paraganglioma and Pheochromocytoma syndrome (SDHA, SDHB, SDHC, SDHD), Tuberous sclerosis complex (TSC1, TSC2), Von Hippel-Lindau disease (VHL), Neurofibromatosis type 1 (NF1), and Multiple Endocrine Neoplasia type 1 (MEN1).    A copy of the test report can be found in the Laboratory tab, dated 9-1-2022, and named \"LABORATORY MISCELLANEOUS ORDER.\" The report is scanned in as a linked " document.    Interpretation:  We discussed several different interpretations of this negative test result:   1. One explanation may be that there is a different gene or combination of genes and environment that are associated with the cancers in this family.  2. Another explanation may be that some her relatives did have a mutation in one of these hereditary cancer syndromes, but she did not inherit it.  3. There is also a small possibility that there is a mutation in one of these genes, and the testing laboratory could not find it with their current testing methods.       Screening:  Based on this negative test result, it is important for Stephen and her relatives to refer back to the family history for appropriate cancer screening.      We talked about today that since we do not have a genetic explanation for the breast cancer in Stephen's family, this negative genetic test result does not give us specific breast cancer risk information for Stephen personally.  In this circumstance, we talked about that a few different breast cancer risk models have been used to try to estimate a woman's breast cancer risk, in order to determine which women should consider increased breast cancer surveillance:      - We talked about that the Alphonso model is based on family history of breast cancer in first and second degree relatives only; we talked about that this model would allow me to estimate breast cancer risk based on her maternal grandmother's history history of breast cancer.  Stephen reports that her maternal grandmother was diagnosed with breast cancer at age 42; using this information, the Alphonso tables gives a 10.4% lifetime risk of breast cancer.        - We talked about that another model (the MONTEZ breast cancer risk calculator) gives breast cancer risk estimates based on family history, plus some additional risk factors such as breast density, previous breast biopsies, age of menarche/first child, and previous genetic  "testing results; the MONTEZ risk calculator predicted around a 17% lifetime risk for breast cancer for Stephen.      We talked about that when a women's estimated lifetime risk of breast cancer is 20% or higher based on at least one of these models, it is generally recommended that they have additional conversations about the options for increased breast cancer surveillance.  We talked about that since Stephen's estimated breast cancer risk is not 20% or higher based on the above models, \"increased risk\" breast screening protocols not indicated for Stephen. (Stephen's maternal aunts should talk with their health care team about their family history of breast cancer, as they may qualify for increased breast screening based on family history and their own personal health history.)        However, we talked about that it is generally recommended that individuals with a family history of early onset breast cancer start their breast cancer screening imaging about 10 years before the earliest age of breast cancer in the family.  Since Stephen's maternal grandmother was diagnosed with breast cancer at age 42, it is appropriate for Stephen to begin her yearly mammogram screening at this time.       We also discussed that given that Stephen's mother had a personal history of melanoma, it would be reasonable for Stephen and her brother (and her mother's siblings) to have annual skin exams through a dermatologist or primary care provider.  (More frequent screening may be indicated based on a particular individual's skin findings.)      Other population cancer screening options, such as those recommended by the American Cancer Society and the National Comprehensive Cancer Network (NCCN), are also appropriate for Stephen and her family.       These screening recommendations may change if there are changes to Stephen's personal and/or family history of cancer. Final screening recommendations should be made by each individual's primary care " provider    Additional Testing Considerations:  It is possible Stpehen does carry a gene or combination of genes and environment that increase her risk for cancer that was not identifiable through this particular genetic testing panel. Stephen is encouraged to contact me in the future if she wants to know if there is additional genetic testing that might be available/indicated for her then or if she wishes to readdress larger gene panel options in the future.     Although Stephen's genetic testing result was negative, other relatives may still carry a gene mutation associated with breast (or other) cancer. Genetic counseling is recommended for Stephen's maternal aunts, in order to discuss genetic testing options. If any of these relatives do pursue genetic testing, Stephen is encouraged to contact me so that we may review the impact of their test results on her.    Summary:  We do not have an explanation for Stephen's family history of cancer. While no genetic changes were identified, Stephen may still be at risk for certain cancers due to family history, environmental factors, or other genetic causes not identified by this test.  Because of that, it is important that she continue with cancer screening based on her personal and family history as discussed above.    Genetic testing is rapidly advancing, and new cancer susceptibility genes will most likely be identified in the future. Therefore, I encouraged Stephen to contact me annually or if there are changes in her personal or family history. This may change how we assess her cancer risk, screening, and the testing we would offer.    Plan:  1.  I provided Stephen with a copy of her test results today through the [employer] portal.  She will also get a copy of this clinic note and a handout summarizing negative genetic test results (see after visit summary).  2. Stephen reports that she is in the process of getting ready to moving to another state, and she plans to talk with her new  primary care provider about mammogram screening, once she establishes care there.  3. Stephen should contact me periodically, or if her personal or family history of cancer changes, and she would like to know if there are additional screening or testing recommendations at that time.    If Stephen has any further questions, I encouraged her to contact me via [employer] or through email: [email or web address].    Patrizia Denton MS, [facility]  Genetic Counselor  Cancer Risk Management Program    Time spent over video: 14 minutes

## 2023-05-08 ENCOUNTER — HEALTH MAINTENANCE LETTER (OUTPATIENT)
Age: 38
End: 2023-05-08

## 2023-08-30 DIAGNOSIS — J45.20 MILD INTERMITTENT ASTHMA WITHOUT COMPLICATION: ICD-10-CM

## 2023-08-30 RX ORDER — FLUTICASONE PROPIONATE 50 MCG
SPRAY, SUSPENSION (ML) NASAL
Qty: 48 G | Refills: 0 | Status: SHIPPED | OUTPATIENT
Start: 2023-08-30

## 2023-08-30 NOTE — LETTER
August 31, 2023    Stephen Nye  [address] [address] [address]    Dear Stephen,       We recently received a refill request for fluticasone (FLONASE) 50 MCG/ACT nasal spray.  We have refilled this for a one time supply only because you are due for a:    Annual Wellness office visit      Please call at your earliest convenience so that there will not be a delay with your future refills.          Thank you,   Your [facility] Team/[place]  [phone]

## 2024-07-14 ENCOUNTER — HEALTH MAINTENANCE LETTER (OUTPATIENT)
Age: 39
End: 2024-07-14

## 2025-07-19 ENCOUNTER — HEALTH MAINTENANCE LETTER (OUTPATIENT)
Age: 40
End: 2025-07-19

## (undated) DEVICE — BLADE KNIFE SURG 11 371111

## (undated) DEVICE — GLOVE PROTEXIS BLUE W/NEU-THERA 6.0  2D73EB60

## (undated) DEVICE — SU MONOCRYL 4-0 PS-2 18" UND Y496G

## (undated) DEVICE — KIT PATIENT POSITIONING PIGAZZI LATEX FREE 40580

## (undated) DEVICE — ENDO TROCAR SLEEVE KII ADV FIXATION 05X100MM CFS02

## (undated) DEVICE — ADH SKIN CLOSURE PREMIERPRO EXOFIN 1.0ML 3470

## (undated) DEVICE — ENDO TROCAR FIRST ENTRY KII FIOS ADV FIX 05X100MM CFF03

## (undated) DEVICE — GLOVE PROTEXIS W/NEU-THERA 5.5  2D73TE55

## (undated) DEVICE — DRAPE LAVH/LAPAROSCOPY W/POUCH 29474

## (undated) DEVICE — ESU ENDO SCISSORS 5MM CVD 5DCS

## (undated) DEVICE — CATH INTERMITTENT CLEAN-CATH FEMALE 14FR 6" VINYL LF 420614

## (undated) DEVICE — ESU LIGASURE LAPAROSCOPIC BLUNT TIP SEALER 5MMX37CM LF1837

## (undated) DEVICE — PREP CHLORAPREP 26ML TINTED ORANGE  260815

## (undated) DEVICE — PACK MINOR SBA15MIFSE

## (undated) DEVICE — DRAPE POUCH INSTRUMENT 3 POCKET 1018L

## (undated) RX ORDER — KETOROLAC TROMETHAMINE 30 MG/ML
INJECTION, SOLUTION INTRAMUSCULAR; INTRAVENOUS
Status: DISPENSED
Start: 2022-05-19

## (undated) RX ORDER — BUPIVACAINE HYDROCHLORIDE AND EPINEPHRINE 2.5; 5 MG/ML; UG/ML
INJECTION, SOLUTION INFILTRATION; PERINEURAL
Status: DISPENSED
Start: 2022-05-19

## (undated) RX ORDER — ACETAMINOPHEN 325 MG/1
TABLET ORAL
Status: DISPENSED
Start: 2022-05-19

## (undated) RX ORDER — FENTANYL CITRATE 50 UG/ML
INJECTION, SOLUTION INTRAMUSCULAR; INTRAVENOUS
Status: DISPENSED
Start: 2022-05-19